# Patient Record
Sex: MALE | Race: WHITE | NOT HISPANIC OR LATINO | Employment: OTHER | URBAN - METROPOLITAN AREA
[De-identification: names, ages, dates, MRNs, and addresses within clinical notes are randomized per-mention and may not be internally consistent; named-entity substitution may affect disease eponyms.]

---

## 2017-09-29 DIAGNOSIS — I73.9 PAD (PERIPHERAL ARTERY DISEASE): Primary | ICD-10-CM

## 2017-10-02 ENCOUNTER — CLINICAL SUPPORT (OUTPATIENT)
Dept: CARDIOLOGY | Facility: CLINIC | Age: 77
End: 2017-10-02
Payer: MEDICARE

## 2017-10-02 DIAGNOSIS — I73.9 PAD (PERIPHERAL ARTERY DISEASE): ICD-10-CM

## 2017-10-02 LAB — VASCULAR ANKLE BRACHIAL INDEX (ABI) RIGHT: 0.96 (ref 0.9–1.2)

## 2017-10-02 PROCEDURE — 93924 LWR XTR VASC STDY BILAT: CPT | Mod: S$GLB,,, | Performed by: INTERNAL MEDICINE

## 2017-10-04 ENCOUNTER — OFFICE VISIT (OUTPATIENT)
Dept: CARDIOLOGY | Facility: CLINIC | Age: 77
End: 2017-10-04
Payer: MEDICARE

## 2017-10-04 ENCOUNTER — DOCUMENTATION ONLY (OUTPATIENT)
Dept: CARDIOLOGY | Facility: CLINIC | Age: 77
End: 2017-10-04

## 2017-10-04 VITALS
DIASTOLIC BLOOD PRESSURE: 102 MMHG | HEART RATE: 72 BPM | WEIGHT: 164.88 LBS | OXYGEN SATURATION: 98 % | HEIGHT: 69 IN | BODY MASS INDEX: 24.42 KG/M2 | SYSTOLIC BLOOD PRESSURE: 199 MMHG

## 2017-10-04 DIAGNOSIS — I73.9 PAD (PERIPHERAL ARTERY DISEASE): Primary | ICD-10-CM

## 2017-10-04 DIAGNOSIS — R07.9 CHEST PAIN, UNSPECIFIED TYPE: ICD-10-CM

## 2017-10-04 DIAGNOSIS — I77.9 ARTERIAL DISEASE: ICD-10-CM

## 2017-10-04 PROCEDURE — 99204 OFFICE O/P NEW MOD 45 MIN: CPT | Mod: S$GLB,,, | Performed by: INTERNAL MEDICINE

## 2017-10-04 PROCEDURE — 99999 PR PBB SHADOW E&M-EST. PATIENT-LVL IV: CPT | Mod: PBBFAC,,, | Performed by: INTERNAL MEDICINE

## 2017-10-04 RX ORDER — FLUTICASONE PROPIONATE 50 MCG
1 SPRAY, SUSPENSION (ML) NASAL DAILY PRN
COMMUNITY
Start: 2017-09-07

## 2017-10-04 RX ORDER — SODIUM CHLORIDE 9 MG/ML
3 INJECTION, SOLUTION INTRAVENOUS CONTINUOUS
Status: CANCELLED | OUTPATIENT
Start: 2017-10-04 | End: 2017-10-04

## 2017-10-04 RX ORDER — ASPIRIN 81 MG/1
81 TABLET ORAL 2 TIMES DAILY
COMMUNITY

## 2017-10-04 RX ORDER — ATORVASTATIN CALCIUM 10 MG/1
10 TABLET, FILM COATED ORAL DAILY
Qty: 90 TABLET | Refills: 3 | Status: SHIPPED | OUTPATIENT
Start: 2017-10-04 | End: 2017-11-08 | Stop reason: SDUPTHER

## 2017-10-04 RX ORDER — AMLODIPINE BESYLATE 5 MG/1
5 TABLET ORAL DAILY
Qty: 30 TABLET | Refills: 11 | Status: SHIPPED | OUTPATIENT
Start: 2017-10-04 | End: 2017-11-08 | Stop reason: SDUPTHER

## 2017-10-04 RX ORDER — DIPHENHYDRAMINE HCL 25 MG
50 CAPSULE ORAL ONCE
Status: CANCELLED | OUTPATIENT
Start: 2017-10-04 | End: 2017-10-04

## 2017-10-04 NOTE — PROGRESS NOTES
OUTPATIENT CATHETERIZATION INSTRUCTIONS    You have been scheduled for a procedure in the catheterization lab on Monday, October 23, 2017.     Please report to the Cardiology Waiting Area on the Third floor of the hospital and check in at 7 AM.   You will then be taken to the SSCU (Short Stay Cardiac Unit) and prepared for your procedure. Please be aware that this is not the time of your procedure but the time you are to arrive. The procedures are scheduled on an hourly basis; however, emergency cases take precedence over all other cases.       You may not have anything to eat or drink after midnight the night before your test. You may take your regular morning medications with water. If there are any medications that you should not take you will be instructed to hold them that morning. If you are diabetic and on Metformin (Glucophage) do not take it the day before, the day of, and for 2 days after your procedure.      The procedure will take 1-2 hours to perform. After the procedure, you will return to SSCU on the third floor of the hospital. You will need to lie still (or keep your arm still) for the next 4 to 6 hours to minimize bleeding from the puncture site. Your family may remain in the room with you during this time.       You may be able to be discharged home that same afternoon if there is someone to drive you home and there were no complications. If you have one of the balloon, stent, or device procedures you may spend the night in the hospital. Your doctor will determine, based on your progress, the date and time of your discharge. The results of your procedure will be discussed with you before you are discharged. Any further testing or procedures will be scheduled for you either before you leave or you will be called with these appointments.       If you should have any questions, concerns, or need to change the date of your procedure, please call GILBERT Mcdonnell @ [phone]    Special  Instructions:    None        THE ABOVE INSTRUCTIONS WERE GIVEN TO THE PATIENT VERBALLY AND THEY VERBALIZED UNDERSTANDING.  THEY DO NOT REQUIRE ANY SPECIAL NEEDS AND DO NOT HAVE ANY LEARNING BARRIERS.          Directions for Reporting to Cardiology Waiting Area in the Hospital  If you park in the Parking Garage:  Take elevators to the1st floor of the parking garage.  Continue past the gift shop, coffee shop, and piano.  Take a right and go to the gold elevators. (Elevator B)  Take the elevator to the 3rd floor.  Follow the arrow on the sign on the wall that says Cath Lab Registration/EP Lab Registration.  Follow the long hallway all the way around until you come to a big open area.  This is the registration area.  Check in at Reception Desk.    OR    If family is dropping you off:  Have them drop you off at the front of the Hospital under the green overhang.  Enter through the doors and take a right.  Take the E elevators to the 3rd floor Cardiology Waiting Area.  Check in at the Reception Desk in the waiting room.

## 2017-10-04 NOTE — PROGRESS NOTES
"    Interventional Cardiology Clinic Note  Reason for Visit: Fall    HPI:   78 y/o male who reports a h/o 5 stents (physical record shows 90% left iliac stenosis with multiple stents) in 2001 and balloon angioplasty in 1981 in the [facility]. He says he gets pain in the posterior thighs and buttocks over the last year. As for calves, "not that much pain". The patient reports he can walk 2 blocks before he gets pain in the mid-back, posterior thighs and buttocks. He denies CP and rests for one minute before resuming walking. The patient will get LOZA "at times, depending on how fast I'm moving". He takes two "cheap" aspirins daily. The patient reports bilateral toe tingling at rest for the last year. He denies poikilothermia. The patient denies claudication at rest. The patient does not awaken with sleep regularly with LE pain. He denies wounds and ulcers on his toes. He currently smokes.     Review of Systems   Constitution: Negative for chills and fever.   HENT: Negative for ear discharge.    Eyes: Negative for pain and visual disturbance.   Cardiovascular: Positive for claudication. Negative for chest pain, dyspnea on exertion, irregular heartbeat, leg swelling, orthopnea, palpitations, paroxysmal nocturnal dyspnea and syncope.   Respiratory: Negative for hemoptysis, shortness of breath and wheezing.    Skin: Negative for rash and suspicious lesions.   Musculoskeletal: Negative for joint pain and muscle weakness.   Gastrointestinal: Negative for abdominal pain, diarrhea, hematemesis, hematochezia, melena, nausea and vomiting.   Genitourinary: Negative for dysuria and frequency.   Neurological: Negative for focal weakness, headaches and tremors.   Psychiatric/Behavioral: Negative for altered mental status, suicidal ideas and thoughts of violence.       PMH:   PAD (left iliac stenting)  Torn rotator cuff surgery  Stenting (left) and balloon angioplasty (right)  Allergies:     Review of patient's allergies indicates: " "  Allergen Reactions    Pcn [penicillins] Swelling     Medications:     ASA    Social History:     Social History   Substance Use Topics    Smoking status: Current Every Day Smoker    Smokeless tobacco: Never Used    Alcohol use No     Family History:   History reviewed. No pertinent family history.    Physical Exam   Constitutional: He is oriented to person, place, and time. He appears well-developed.   HENT:   Head: Normocephalic and atraumatic.   Eyes: EOM are normal.   Neck: Normal range of motion.   Cardiovascular: Normal rate, regular rhythm and normal heart sounds.  Exam reveals no gallop and no friction rub.    No murmur heard.  Pulses:       Radial pulses are 2+ on the right side, and 2+ on the left side.   2+ radial, femoral, popliteal, DP, and TP pulses bilaterally. No ulcers or wounds on the LEs. +Varicose veins   Pulmonary/Chest: Effort normal. He has no wheezes. He has no rales.   Abdominal: Soft. Bowel sounds are normal. He exhibits no distension. There is no tenderness. There is no rebound.   Musculoskeletal: Normal range of motion. He exhibits no edema.   Neurological: He is alert and oriented to person, place, and time. He has normal strength. No cranial nerve deficit or sensory deficit.   Skin: Skin is warm.   Psychiatric: He has a normal mood and affect.     BP (!) 199/102 (BP Location: Right arm, Patient Position: Sitting, BP Method: Large (Automatic))   Pulse 72   Ht 5' 9" (1.753 m)   Wt 74.8 kg (164 lb 14.5 oz)   SpO2 98%   BMI 24.35 kg/m²          Labs:     No results found for: NA, K, CL, CO2, BUN, CREATININE, GLUCOSE, ANIONGAP  No results found for: HGBA1C  No results found for: BNP, BNPTRIAGEBLO No results found for: WBC, HGB, HCT, PLT, GRAN  No results found for: CHOL, HDL, LDLCALC, TRIG       No results found for: EF    EKG: None on file    Assessment and Plan  78 y/o male who reports a h/o 5 stents (physical record shows 90% left iliac stenosis with multiple stents) in 2001 and " balloon angioplasty in 1981 in the [facility]. The patient presents with exertional mid-back pain, thigh, and buttock pain.     Claudication, Carlos IIb  - 30% decrease of RMAESH post-exercise  - aortogram with bilateral runoff  - PTA candidate (BMS) with Plavix for one month if stent used (cataract surgery soon)  - start low dose atorvastatin (check LFTs)  - consents signed  - left CFA access  - c/w ASA    HTN  - PET stress test with RFs that include claudication and severe HTN, and smoking  - start amlodipine     Tobacco abuse  - encourage smoking cessation    Signed:    Luis Manuel Quijano

## 2017-10-20 ENCOUNTER — CLINICAL SUPPORT (OUTPATIENT)
Dept: CARDIOLOGY | Facility: CLINIC | Age: 77
End: 2017-10-20
Payer: MEDICARE

## 2017-10-20 DIAGNOSIS — R07.9 CHEST PAIN, UNSPECIFIED TYPE: ICD-10-CM

## 2017-10-20 LAB — DIASTOLIC DYSFUNCTION: NO

## 2017-10-20 PROCEDURE — 78492 MYOCRD IMG PET MLT RST&STRS: CPT | Mod: S$GLB,,, | Performed by: INTERNAL MEDICINE

## 2017-10-20 PROCEDURE — A9555 RB82 RUBIDIUM: HCPCS | Mod: S$GLB,,, | Performed by: INTERNAL MEDICINE

## 2017-10-20 PROCEDURE — 93015 CV STRESS TEST SUPVJ I&R: CPT | Mod: S$GLB,,, | Performed by: INTERNAL MEDICINE

## 2017-10-23 ENCOUNTER — SURGERY (OUTPATIENT)
Age: 77
End: 2017-10-23

## 2017-10-23 ENCOUNTER — HOSPITAL ENCOUNTER (OUTPATIENT)
Facility: HOSPITAL | Age: 77
Discharge: HOME | End: 2017-10-23
Attending: INTERNAL MEDICINE | Admitting: INTERNAL MEDICINE
Payer: MEDICARE

## 2017-10-23 VITALS
HEIGHT: 69 IN | DIASTOLIC BLOOD PRESSURE: 92 MMHG | WEIGHT: 164.88 LBS | HEART RATE: 69 BPM | SYSTOLIC BLOOD PRESSURE: 187 MMHG | OXYGEN SATURATION: 95 % | RESPIRATION RATE: 20 BRPM | BODY MASS INDEX: 24.42 KG/M2 | TEMPERATURE: 98 F

## 2017-10-23 DIAGNOSIS — I73.9 PAD (PERIPHERAL ARTERY DISEASE): ICD-10-CM

## 2017-10-23 DIAGNOSIS — R94.39 ABNORMAL RESULT OF OTHER CARDIOVASCULAR FUNCTION STUDY (CODE): ICD-10-CM

## 2017-10-23 PROBLEM — I10 HYPERTENSION: Status: ACTIVE | Noted: 2017-10-23

## 2017-10-23 LAB
ABO + RH BLD: NORMAL
BLD GP AB SCN CELLS X3 SERPL QL: NORMAL

## 2017-10-23 PROCEDURE — 86901 BLOOD TYPING SEROLOGIC RH(D): CPT

## 2017-10-23 PROCEDURE — 86900 BLOOD TYPING SEROLOGIC ABO: CPT

## 2017-10-23 PROCEDURE — 93458 L HRT ARTERY/VENTRICLE ANGIO: CPT | Mod: 26,,, | Performed by: INTERNAL MEDICINE

## 2017-10-23 PROCEDURE — 99152 MOD SED SAME PHYS/QHP 5/>YRS: CPT

## 2017-10-23 PROCEDURE — 75716 ARTERY X-RAYS ARMS/LEGS: CPT | Mod: 26,59,, | Performed by: INTERNAL MEDICINE

## 2017-10-23 PROCEDURE — 36246 INS CATH ABD/L-EXT ART 2ND: CPT | Mod: 59,,, | Performed by: INTERNAL MEDICINE

## 2017-10-23 PROCEDURE — 25000003 PHARM REV CODE 250: Performed by: INTERNAL MEDICINE

## 2017-10-23 PROCEDURE — 75625 CONTRAST EXAM ABDOMINL AORTA: CPT | Mod: 26,59,, | Performed by: INTERNAL MEDICINE

## 2017-10-23 PROCEDURE — 99152 MOD SED SAME PHYS/QHP 5/>YRS: CPT | Mod: ,,, | Performed by: INTERNAL MEDICINE

## 2017-10-23 RX ORDER — NAPROXEN SODIUM 220 MG/1
81 TABLET, FILM COATED ORAL ONCE
Status: COMPLETED | OUTPATIENT
Start: 2017-10-23 | End: 2017-10-23

## 2017-10-23 RX ORDER — CLOPIDOGREL 300 MG/1
600 TABLET, FILM COATED ORAL ONCE
Status: COMPLETED | OUTPATIENT
Start: 2017-10-23 | End: 2017-10-23

## 2017-10-23 RX ORDER — SODIUM CHLORIDE 9 MG/ML
3 INJECTION, SOLUTION INTRAVENOUS CONTINUOUS
Status: ACTIVE | OUTPATIENT
Start: 2017-10-23 | End: 2017-10-23

## 2017-10-23 RX ORDER — SODIUM CHLORIDE 9 MG/ML
1.5 INJECTION, SOLUTION INTRAVENOUS CONTINUOUS
Status: ACTIVE | OUTPATIENT
Start: 2017-10-23 | End: 2017-10-23

## 2017-10-23 RX ORDER — CARVEDILOL 6.25 MG/1
6.25 TABLET ORAL 2 TIMES DAILY WITH MEALS
Qty: 180 TABLET | Refills: 3 | Status: SHIPPED | OUTPATIENT
Start: 2017-10-23 | End: 2018-10-19 | Stop reason: SDUPTHER

## 2017-10-23 RX ORDER — CARVEDILOL 6.25 MG/1
6.25 TABLET ORAL 2 TIMES DAILY
Status: DISCONTINUED | OUTPATIENT
Start: 2017-10-23 | End: 2017-10-23 | Stop reason: HOSPADM

## 2017-10-23 RX ORDER — DIPHENHYDRAMINE HCL 50 MG
50 CAPSULE ORAL ONCE
Status: COMPLETED | OUTPATIENT
Start: 2017-10-23 | End: 2017-10-23

## 2017-10-23 RX ORDER — AMLODIPINE BESYLATE 5 MG/1
5 TABLET ORAL ONCE
Status: COMPLETED | OUTPATIENT
Start: 2017-10-23 | End: 2017-10-23

## 2017-10-23 RX ADMIN — AMLODIPINE BESYLATE 5 MG: 5 TABLET ORAL at 09:10

## 2017-10-23 RX ADMIN — DIPHENHYDRAMINE HYDROCHLORIDE 50 MG: 50 CAPSULE ORAL at 07:10

## 2017-10-23 RX ADMIN — CLOPIDOGREL BISULFATE 600 MG: 300 TABLET, FILM COATED ORAL at 09:10

## 2017-10-23 RX ADMIN — SODIUM CHLORIDE 3 ML/KG/HR: 0.9 INJECTION, SOLUTION INTRAVENOUS at 07:10

## 2017-10-23 RX ADMIN — CARVEDILOL 6.25 MG: 6.25 TABLET, FILM COATED ORAL at 03:10

## 2017-10-23 RX ADMIN — ASPIRIN 81 MG CHEWABLE TABLET 81 MG: 81 TABLET CHEWABLE at 09:10

## 2017-10-23 NOTE — PROCEDURES
"    Post Cath Note  Referring Physician: John Small MD  Procedure: ANGIOGRAM-EXTREMITY-LOWER (N/A), PTA-PERIPHERAL (N/A)       Access: Right CFA    Three vessel coronary artery disease. LMCA disease. PAD.    See full report for further details    Intervention:   None  Closure device: Manual pressure    Post Cath Exam:   BP (!) 183/60 (BP Location: Left arm, Patient Position: Lying)   Pulse 78   Temp 96.4 °F (35.8 °C) (Oral)   Resp 20   Ht 5' 9" (1.753 m)   Wt 74.8 kg (164 lb 14.5 oz)   SpO2 97%   BMI 24.35 kg/m²   No unusual pain, thrill or bruit at vascular access site.  Distal pulse present without signs of ischemia. Small hematoma noted; hemostasis achieved with additional manual pressure.     Recommendations:   - Routine post-cath care  - IVF at 125 cc/hr for 4 hrs  - Continue medical management, Risk factor reduction, CTS consult, Follow-up with outpatient cardiologist  - start NOAC for PAD  "

## 2017-10-23 NOTE — HPI
78 y/o male who reports a h/o 5 stents (90% left iliac stenosis with multiple stents) in 2001 and balloon angioplasty in 1981 in the [facility]. The patient came into clinic on 10/4. He says he gets pain in the posterior thighs and buttocks over the last year such that he can walk 2 blocks before he gets pain in the mid-back, posterior thighs and buttocks. He denies calf pain, CP and rests for one minute before resuming walking. The patient will get LOZA and reports bilateral toe tingling at rest for the last year. He denies poikilothermia and claudication at rest. The patient does not awaken with sleep regularly with LE pain. He denies wounds and ulcers on his toes. He currently smokes. The patient currently denies CP and SOB.

## 2017-10-23 NOTE — SUBJECTIVE & OBJECTIVE
No past medical history on file.    No past surgical history on file.    Review of patient's allergies indicates:   Allergen Reactions    Pcn [penicillins] Swelling       PTA Medications   Medication Sig    amlodipine (NORVASC) 5 MG tablet Take 1 tablet (5 mg total) by mouth once daily.    ascorbic acid, vitamin C, (VITAMIN C) 100 MG tablet Take 100 mg by mouth once daily.    aspirin (ECOTRIN) 81 MG EC tablet Take 81 mg by mouth 2 (two) times daily.    atorvastatin (LIPITOR) 10 MG tablet Take 1 tablet (10 mg total) by mouth once daily.    fluticasone (FLONASE) 50 mcg/actuation nasal spray      Family History     None        Social History Main Topics    Smoking status: Current Every Day Smoker    Smokeless tobacco: Never Used    Alcohol use No    Drug use: Unknown    Sexual activity: Not on file     Review of Systems   Constitution: Negative for chills and fever.   HENT: Negative for ear discharge.    Eyes: Negative for pain and visual disturbance.   Cardiovascular: Positive for claudication. Negative for chest pain, dyspnea on exertion, irregular heartbeat, leg swelling, orthopnea, palpitations, paroxysmal nocturnal dyspnea and syncope.   Respiratory: Negative for hemoptysis, shortness of breath and wheezing.    Skin: Negative for rash and suspicious lesions.   Musculoskeletal: Negative for joint pain and muscle weakness.   Gastrointestinal: Negative for abdominal pain, diarrhea, hematemesis, hematochezia, melena, nausea and vomiting.   Genitourinary: Negative for dysuria and frequency.   Neurological: Positive for paresthesias. Negative for focal weakness, headaches and tremors.   Psychiatric/Behavioral: Negative for altered mental status, suicidal ideas and thoughts of violence.     Objective:     Vital Signs (Most Recent):    Vital Signs (24h Range):           There is no height or weight on file to calculate BMI.            No intake or output data in the 24 hours ending 10/23/17  0713    Lines/Drains/Airways          No matching active lines, drains, or airways          Physical Exam   Constitutional: He is oriented to person, place, and time. He appears well-developed.   HENT:   Head: Normocephalic and atraumatic.   Eyes: EOM are normal.   Neck: Normal range of motion.   Cardiovascular: Normal rate, regular rhythm and normal heart sounds.  Exam reveals no gallop and no friction rub.    No murmur heard.  Pulses:       Radial pulses are 2+ on the right side, and 2+ on the left side.        Femoral pulses are 2+ on the right side, and 2+ on the left side.       Popliteal pulses are 2+ on the right side, and 2+ on the left side.        Dorsalis pedis pulses are 2+ on the right side, and 2+ on the left side.        Posterior tibial pulses are 2+ on the right side, and 2+ on the left side.   Pulmonary/Chest: Effort normal. He has no wheezes. He has no rales.   Abdominal: Soft. Bowel sounds are normal. He exhibits no distension. There is no tenderness. There is no rebound.   Musculoskeletal: Normal range of motion. He exhibits no edema.   Neurological: He is alert and oriented to person, place, and time. He has normal strength. No cranial nerve deficit or sensory deficit.   Skin: Skin is warm.   Psychiatric: He has a normal mood and affect.       Significant Labs: CMP No results for input(s): NA, K, CL, CO2, GLU, BUN, CREATININE, CALCIUM, PROT, ALBUMIN, BILITOT, ALKPHOS, AST, ALT, ANIONGAP, ESTGFRAFRICA, EGFRNONAA in the last 48 hours., CBC No results for input(s): WBC, HGB, HCT, PLT in the last 48 hours., INR No results for input(s): INR, PROTIME in the last 48 hours., Lipid Panel No results for input(s): CHOL, HDL, LDLCALC, TRIG, CHOLHDL in the last 48 hours. and Troponin No results for input(s): TROPONINI in the last 48 hours.    Significant Imaging:   CONCLUSIONS: ABNORMAL MYOCARDIAL PERFUSION PET STRESS TEST - PET stress 10/20  1. There is a moderate sized mild intensity fixed defect  consistent with non-transmural infarct in the base to apical inferior wall in the usual distribution of the Right Coronary Artery. This defect comprises 15 % of the left ventricular myocardium.   2. There is a small to moderate sized mild ischemia in the anteroapical wall in the usual distribution of the mid Left Anterior Descending Artery and D2. This defect comprises 12 % of the left ventricular myocardium.   3. There is abnormal wall motion at rest showing mild global hypokinesis of the left ventricle. hypokinesis of the inferior wall of the left ventricle. There is abnormal wall motion at stress showing moderate global hypokinesis of the left ventricle.   hypokinesis of the apical and inferior walls of the left ventricle.   4. Resting LV function is 40 % and significantly decreased to 28 % at stress.  (normal is >= 51%)  5. The ventricular volumes are normal at rest and stress.   6. The extracardiac distribution of radioactivity is normal.     RAMESH - 10/2  Right:  The exercise component of this study demonstrates peripheral arterial disease in the right extremity that may account for symptoms if present.     Left:  The exercise component of this study demonstrates peripheral arterial disease in the left extremity that may account for symptoms if present.

## 2017-10-23 NOTE — PROGRESS NOTES
Pt is AAOx3 and in no apparent distress.  Groin site c/d/i and soft.  Provided a copy of discharge instructions.  Teaching performed.  Pt verbalized understanding and denied any questions.  Provided pt with prescriptions. PIV d/c catheter tip intact.  2x2 applied and no active bleeding noted.  Pt refused wheelchair and is walking out with wife for transport home.

## 2017-10-23 NOTE — ASSESSMENT & PLAN NOTE
- no e/o ALI  - RAMESH with similar disease bilaterally  - right CFA access  - aortogram with bilateral runoff  - c/w ASA and statin

## 2017-10-23 NOTE — PROGRESS NOTES
Pt ambulated around unit and groin site remained c/d/i and soft.  VSS.  Wife at bedside.  Will continue to monitor.

## 2017-10-23 NOTE — H&P
[facility]  Interventional Cardiology  H&P    Patient Name: Gal Waller  MRN: [id number]  Admission Date: 10/23/2017  Code Status: No Order   Attending Provider: John Small MD   Primary Care Physician: Talita Arita MD  Principal Problem:<principal problem not specified>    Patient information was obtained from patient and past medical records.     Subjective:     Chief Complaint:  LE pain     HPI:  76 y/o male who reports a h/o 5 stents (90% left iliac stenosis with multiple stents) in 2001 and balloon angioplasty in 1981 in the [facility]. The patient came into clinic on 10/4. He says he gets pain in the posterior thighs and buttocks over the last year such that he can walk 2 blocks before he gets pain in the mid-back, posterior thighs and buttocks. He denies calf pain, CP and rests for one minute before resuming walking. The patient will get LOZA and reports bilateral toe tingling at rest for the last year. He denies poikilothermia and claudication at rest. The patient does not awaken with sleep regularly with LE pain. He denies wounds and ulcers on his toes. He currently smokes. The patient currently denies CP and SOB.    No past medical history on file.    No past surgical history on file.    Review of patient's allergies indicates:   Allergen Reactions    Pcn [penicillins] Swelling       PTA Medications   Medication Sig    amlodipine (NORVASC) 5 MG tablet Take 1 tablet (5 mg total) by mouth once daily.    ascorbic acid, vitamin C, (VITAMIN C) 100 MG tablet Take 100 mg by mouth once daily.    aspirin (ECOTRIN) 81 MG EC tablet Take 81 mg by mouth 2 (two) times daily.    atorvastatin (LIPITOR) 10 MG tablet Take 1 tablet (10 mg total) by mouth once daily.    fluticasone (FLONASE) 50 mcg/actuation nasal spray      Family History     None        Social History Main Topics    Smoking status: Current Every Day Smoker    Smokeless tobacco: Never Used    Alcohol use No    Drug  use: Unknown    Sexual activity: Not on file     Review of Systems   Constitution: Negative for chills and fever.   HENT: Negative for ear discharge.    Eyes: Negative for pain and visual disturbance.   Cardiovascular: Positive for claudication. Negative for chest pain, dyspnea on exertion, irregular heartbeat, leg swelling, orthopnea, palpitations, paroxysmal nocturnal dyspnea and syncope.   Respiratory: Negative for hemoptysis, shortness of breath and wheezing.    Skin: Negative for rash and suspicious lesions.   Musculoskeletal: Negative for joint pain and muscle weakness.   Gastrointestinal: Negative for abdominal pain, diarrhea, hematemesis, hematochezia, melena, nausea and vomiting.   Genitourinary: Negative for dysuria and frequency.   Neurological: Positive for paresthesias. Negative for focal weakness, headaches and tremors.   Psychiatric/Behavioral: Negative for altered mental status, suicidal ideas and thoughts of violence.     Objective:     Vital Signs (Most Recent):    Vital Signs (24h Range):           There is no height or weight on file to calculate BMI.            No intake or output data in the 24 hours ending 10/23/17 0713    Lines/Drains/Airways          No matching active lines, drains, or airways          Physical Exam   Constitutional: He is oriented to person, place, and time. He appears well-developed.   HENT:   Head: Normocephalic and atraumatic.   Eyes: EOM are normal.   Neck: Normal range of motion.   Cardiovascular: Normal rate, regular rhythm and normal heart sounds.  Exam reveals no gallop and no friction rub.    No murmur heard.  Pulses:       Radial pulses are 2+ on the right side, and 2+ on the left side.        Femoral pulses are 2+ on the right side, and 2+ on the left side.       Popliteal pulses are 2+ on the right side, and 2+ on the left side.        Dorsalis pedis pulses are 2+ on the right side, and 2+ on the left side.        Posterior tibial pulses are 2+ on the right  side, and 2+ on the left side.   Pulmonary/Chest: Effort normal. He has no wheezes. He has no rales.   Abdominal: Soft. Bowel sounds are normal. He exhibits no distension. There is no tenderness. There is no rebound.   Musculoskeletal: Normal range of motion. He exhibits no edema.   Neurological: He is alert and oriented to person, place, and time. He has normal strength. No cranial nerve deficit or sensory deficit.   Skin: Skin is warm.   Psychiatric: He has a normal mood and affect.       Significant Labs: CMP No results for input(s): NA, K, CL, CO2, GLU, BUN, CREATININE, CALCIUM, PROT, ALBUMIN, BILITOT, ALKPHOS, AST, ALT, ANIONGAP, ESTGFRAFRICA, EGFRNONAA in the last 48 hours., CBC No results for input(s): WBC, HGB, HCT, PLT in the last 48 hours., INR No results for input(s): INR, PROTIME in the last 48 hours., Lipid Panel No results for input(s): CHOL, HDL, LDLCALC, TRIG, CHOLHDL in the last 48 hours. and Troponin No results for input(s): TROPONINI in the last 48 hours.    Significant Imaging:   CONCLUSIONS: ABNORMAL MYOCARDIAL PERFUSION PET STRESS TEST - PET stress 10/20  1. There is a moderate sized mild intensity fixed defect consistent with non-transmural infarct in the base to apical inferior wall in the usual distribution of the Right Coronary Artery. This defect comprises 15 % of the left ventricular myocardium.   2. There is a small to moderate sized mild ischemia in the anteroapical wall in the usual distribution of the mid Left Anterior Descending Artery and D2. This defect comprises 12 % of the left ventricular myocardium.   3. There is abnormal wall motion at rest showing mild global hypokinesis of the left ventricle. hypokinesis of the inferior wall of the left ventricle. There is abnormal wall motion at stress showing moderate global hypokinesis of the left ventricle.   hypokinesis of the apical and inferior walls of the left ventricle.   4. Resting LV function is 40 % and significantly decreased  to 28 % at stress.  (normal is >= 51%)  5. The ventricular volumes are normal at rest and stress.   6. The extracardiac distribution of radioactivity is normal.     RAMESH - 10/2  Right:  The exercise component of this study demonstrates peripheral arterial disease in the right extremity that may account for symptoms if present.     Left:  The exercise component of this study demonstrates peripheral arterial disease in the left extremity that may account for symptoms if present.     Assessment and Plan:     Hypertension    - c/w amlodipine        Abnormal cardiovascular stress test    - asx but PET c/w small-moderate mild ischemia in the anteroapical wall (12 % of the LV myocardium)  - consent obtained for LHC via right CFA access  - c/w ASA and statin         PAD (peripheral artery disease)    - no e/o ALI  - RAMESH with similar disease bilaterally  - right CFA access  - aortogram with bilateral runoff  - c/w ASA and statin              VTE Risk Mitigation     None          Luis Manuel Quijano MD  Interventional Cardiology   [facility]

## 2017-10-23 NOTE — PLAN OF CARE
Problem: Patient Care Overview  Goal: Plan of Care Review  Outcome: Ongoing (interventions implemented as appropriate)  Received report from GILBERT Vasquez. Patient s/p LHC and PTA, AAOx3. VSS, no c/o pain or discomfort at this time, resp even and unlabored. Gauze/tegaderm dressing to R groin is CDI. No active bleeding. Bruising noted to groin, No hematoma noted. Post procedure protocol reviewed with patient. Understanding verbalized. Family members updated via phone.. Nurse call bell within reach. Will continue to monitor per post procedure protocol.

## 2017-10-23 NOTE — ASSESSMENT & PLAN NOTE
- asx but PET c/w small-moderate mild ischemia in the anteroapical wall (12 % of the LV myocardium)  - consent obtained for LHC via right CFA access  - c/w ASA and statin

## 2017-10-24 NOTE — DISCHARGE SUMMARY
[facility]  Interventional Cardiology  Discharge Summary      Patient Name: Gal Waller  MRN: [id number]  Admission Date: 10/23/2017  Hospital Length of Stay: 0 days  Discharge Date and Time:  10/23/2017 8:55 PM  Attending Physician: No att. providers found  Discharging Provider: Luis Manuel Quijano MD  Primary Care Physician: Talita Arita MD    HPI: 76 y/o male who reports a h/o 5 stents (90% left iliac stenosis with multiple stents) in 2001 and balloon angioplasty in 1981 in the RLE. The patient came into clinic on 10/4. He says he gets pain in the posterior thighs and buttocks over the last year such that he can walk 2 blocks before he gets pain in the mid-back, posterior thighs and buttocks. He denies calf pain, CP and rests for one minute before resuming walking. The patient will get LOZA and reports bilateral toe tingling at rest for the last year. He denies poikilothermia and claudication at rest. The patient does not awaken with sleep regularly with LE pain. He denies wounds and ulcers on his toes. He currently smokes. The patient currently denies CP and SOB.    Procedure(s) (LRB):  ANGIOGRAM-EXTREMITY-LOWER (N/A)  PTA-PERIPHERAL (N/A)     Indwelling Lines/Drains at time of discharge:  Lines/Drains/Airways          No matching active lines, drains, or airways          Hospital Course The patient underwent LHC and aortogram with LE runoff. There were no immediate complications. LHC c/w three vessel coronary artery disease, LMCA disease. He has PAD, as per report. CTS surgery evaluation as an outpatient. NOAC for PAD. Coreg addition for CAD and HTN.       Significant Diagnostic Studies: Angiograms    Pending Diagnostic Studies:     None        Final Active Diagnoses:    Diagnosis Date Noted POA    Abnormal cardiovascular stress test [R94.39] 10/23/2017 Yes    Hypertension [I10] 10/23/2017 Yes    PAD (peripheral artery disease) [I73.9] 10/04/2017 Yes      Problems Resolved During  this Admission:    Diagnosis Date Noted Date Resolved POA       Discharged Condition: good    Follow Up:  Follow-up Information     Talita Arita MD.    Specialty:  Internal Medicine  Contact information:  [address] [facility] [address]  [facility] [address]  [phone]             Cardiothoracic Surgery In 2 weeks.    Contact information:  [address]  [address]  [facility] [address]  [phone]             John Small MD.    Specialties:  Cardiology, INTERVENTIONAL CARDIOLOGY  Contact information:  [address]  [facility] [address]  [phone]                 Patient Instructions:     Ambulatory consult to Cardiothoracic Surgery   Referral Priority: Routine Referral Type: Surgical   Referral Reason: Specialty Services Required    Requested Specialty: Cardiothoracic Surgery    Number of Visits Requested: 1      Diet general     Activity as tolerated     Call MD for:  temperature >100.4     Call MD for:  persistent nausea and vomiting or diarrhea     Call MD for:  severe uncontrolled pain     Call MD for:  redness, tenderness, or signs of infection (pain, swelling, redness, odor or green/yellow discharge around incision site)     Call MD for:  difficulty breathing or increased cough     Call MD for:  severe persistent headache     Call MD for:  worsening rash     Call MD for:  persistent dizziness, light-headedness, or visual disturbances     Call MD for:  increased confusion or weakness       Medications:  Reconciled Home Medications:   Discharge Medication List as of 10/23/2017  5:12 PM      START taking these medications    Details   apixaban 5 mg Tab Take 1 tablet (5 mg total) by mouth 2 (two) times daily., Starting Mon 10/23/2017, Normal      carvedilol (COREG) 6.25 MG tablet Take 1 tablet (6.25 mg total) by mouth 2 (two) times daily with meals., Starting Mon 10/23/2017, Until Tue 10/23/2018, Normal         CONTINUE these medications which have NOT CHANGED    Details   amlodipine (NORVASC) 5  MG tablet Take 1 tablet (5 mg total) by mouth once daily., Starting Wed 10/4/2017, Until Thu 10/4/2018, Normal      ascorbic acid, vitamin C, (VITAMIN C) 100 MG tablet Take 100 mg by mouth once daily., Historical Med      aspirin (ECOTRIN) 81 MG EC tablet Take 81 mg by mouth 2 (two) times daily., Historical Med      atorvastatin (LIPITOR) 10 MG tablet Take 1 tablet (10 mg total) by mouth once daily., Starting Wed 10/4/2017, Until Thu 10/4/2018, Normal      fluticasone (FLONASE) 50 mcg/actuation nasal spray Starting Thu 9/7/2017, Historical Med             Disposition: Home    Luis Manuel Quijano MD  Interventional Cardiology  [facility]

## 2017-10-30 ENCOUNTER — OFFICE VISIT (OUTPATIENT)
Dept: CARDIOTHORACIC SURGERY | Facility: CLINIC | Age: 77
End: 2017-10-30
Payer: MEDICARE

## 2017-10-30 VITALS
WEIGHT: 167.19 LBS | HEIGHT: 69 IN | BODY MASS INDEX: 24.76 KG/M2 | OXYGEN SATURATION: 99 % | SYSTOLIC BLOOD PRESSURE: 185 MMHG | TEMPERATURE: 98 F | HEART RATE: 62 BPM | DIASTOLIC BLOOD PRESSURE: 79 MMHG

## 2017-10-30 DIAGNOSIS — I25.10 CORONARY ARTERY DISEASE INVOLVING NATIVE CORONARY ARTERY OF NATIVE HEART WITHOUT ANGINA PECTORIS: ICD-10-CM

## 2017-10-30 PROCEDURE — 99999 PR PBB SHADOW E&M-EST. PATIENT-LVL III: CPT | Mod: PBBFAC,,, | Performed by: THORACIC SURGERY (CARDIOTHORACIC VASCULAR SURGERY)

## 2017-10-30 PROCEDURE — 99205 OFFICE O/P NEW HI 60 MIN: CPT | Mod: S$GLB,,, | Performed by: THORACIC SURGERY (CARDIOTHORACIC VASCULAR SURGERY)

## 2017-10-30 NOTE — LETTER
November 1, 2017      John Small MD  [address]  [facility] [address]           [facility] - Cardiovascular Surg  [address]  [facility] [address]  Phone: [phone]          Patient: Gal Waller   MR Number: [id number]   YOB: 1940   Date of Visit: 10/30/2017       Dear Dr. John Small:    Thank you for referring Gal Waller to me for evaluation. Attached you will find relevant portions of my assessment and plan of care.    If you have questions, please do not hesitate to call me. I look forward to following Gal Waller along with you.    Sincerely,    Everette Lira MD    Enclosure  CC:  No Recipients    If you would like to receive this communication electronically, please contact [email or web address] or [phone] to request more information on [employer] access.    For providers and/or their staff who would like to refer a patient to [facility], please contact us through our one-stop-shop provider referral line, [facility] , at [phone].    If you feel you have received this communication in error or would no longer like to receive these types of communications, please e-mail [email or web address]

## 2017-10-30 NOTE — PROGRESS NOTES
History & Physical    SUBJECTIVE:     History of Present Illness:  Patient is a 77 y.o. male presents with a h/o 5 stents (90% left iliac stenosis with multiple stents) in 2001 and balloon angioplasty in 1981 in the E. He says he gets pain in the posterior thighs and buttocks over the last year such that he can walk 2 blocks before he gets pain in the mid-back, posterior thighs and buttocks. He denies calf pain, CP and rests for one minute before resuming walking. The patient will get LOZA and reports bilateral toe tingling at rest for the last year. He denies poikilothermia and claudication at rest. The patient does not awaken with sleep regularly with LE pain.  He smoked for 50+year 1/2-3ppd, quite last week. The patient currently denies CP and SOB. It was found on his LHC that he had multivessel disease and was referred to CTS for CABG evaluation.     No chief complaint on file.      Review of patient's allergies indicates:   Allergen Reactions    Pcn [penicillins] Swelling       Current Outpatient Prescriptions   Medication Sig Dispense Refill    amlodipine (NORVASC) 5 MG tablet Take 1 tablet (5 mg total) by mouth once daily. 30 tablet 11    apixaban 5 mg Tab Take 1 tablet (5 mg total) by mouth 2 (two) times daily. 60 tablet 3    ascorbic acid, vitamin C, (VITAMIN C) 100 MG tablet Take 100 mg by mouth once daily.      aspirin (ECOTRIN) 81 MG EC tablet Take 81 mg by mouth 2 (two) times daily.      atorvastatin (LIPITOR) 10 MG tablet Take 1 tablet (10 mg total) by mouth once daily. 90 tablet 3    carvedilol (COREG) 6.25 MG tablet Take 1 tablet (6.25 mg total) by mouth 2 (two) times daily with meals. 180 tablet 3    fluticasone (FLONASE) 50 mcg/actuation nasal spray        No current facility-administered medications for this visit.        No past medical history on file.  No past surgical history on file.  No family history on file.  Social History   Substance Use Topics    Smoking status: Current Every  Day Smoker    Smokeless tobacco: Never Used    Alcohol use No        Review of Systems:  Review of Systems   Constitutional: Negative for activity change.   Respiratory: Negative for cough and shortness of breath.    Cardiovascular: Negative for chest pain, palpitations and leg swelling.   Gastrointestinal: Negative for abdominal pain, nausea and vomiting.   Endocrine: Negative for polydipsia, polyphagia and polyuria.   Genitourinary: Negative for dysuria.   Musculoskeletal: Negative for gait problem.   Skin: Negative for rash.   Allergic/Immunologic: Negative for immunocompromised state.   Neurological: Negative for dizziness, syncope and weakness.   Hematological: Does not bruise/bleed easily.   Psychiatric/Behavioral: Negative for behavioral problems.       OBJECTIVE:     Vital Signs (Most Recent)              Physical Exam:  Physical Exam   Constitutional: He is oriented to person, place, and time. He appears well-nourished.   HENT:   Head: Normocephalic.   Cardiovascular: Normal rate, regular rhythm and normal heart sounds.    Pulmonary/Chest: Effort normal and breath sounds normal.   Abdominal: Soft.   Musculoskeletal: Normal range of motion.   Neurological: He is alert and oriented to person, place, and time.   Skin: Skin is warm and dry. Capillary refill takes 2 to 3 seconds.   Psychiatric: He has a normal mood and affect.     Diagnostic Results:  [facility] Angiographic Results Diagnostic:      - Left Main Coronary Artery:             The distal LM has a 75% stenosis. There is KARSTEN 3 flow.     - Left Anterior Descending Artery:             The proximal LAD has a 90% stenosis. There is KARSTEN 3 flow.     - Left Circumflex Artery:             The mid LCX has a 70% stenosis. There is KARSTEN 3 flow.     - Right Coronary Artery:             The ostial RCA has subtotal (99). There is KARSTEN 3 flow.    ASSESSMENT/PLAN:   Patient is a 77 y.o. male presents with a h/o 5 stents (90% left iliac stenosis with multiple stents) in  2001,  balloon angioplasty in 1981 in the RLE, PAD, HTN, and CAD      PLAN:WIll discuss with Dr. Small PCI due to distal LAD lesion, maybe better to undergo PCI

## 2017-11-03 LAB
CORONARY STENOSIS: ABNORMAL
PERIPHERAL STENOSIS: ABNORMAL

## 2017-11-08 ENCOUNTER — DOCUMENTATION ONLY (OUTPATIENT)
Dept: CARDIOLOGY | Facility: CLINIC | Age: 77
End: 2017-11-08

## 2017-11-08 ENCOUNTER — OFFICE VISIT (OUTPATIENT)
Dept: CARDIOLOGY | Facility: CLINIC | Age: 77
End: 2017-11-08
Payer: MEDICARE

## 2017-11-08 VITALS
WEIGHT: 164.69 LBS | DIASTOLIC BLOOD PRESSURE: 85 MMHG | BODY MASS INDEX: 24.39 KG/M2 | HEART RATE: 75 BPM | OXYGEN SATURATION: 98 % | HEIGHT: 69 IN | SYSTOLIC BLOOD PRESSURE: 177 MMHG

## 2017-11-08 DIAGNOSIS — I25.10 CORONARY ARTERY DISEASE INVOLVING NATIVE CORONARY ARTERY OF NATIVE HEART WITHOUT ANGINA PECTORIS: Primary | ICD-10-CM

## 2017-11-08 DIAGNOSIS — R94.39 ABNORMAL CARDIOVASCULAR STRESS TEST: ICD-10-CM

## 2017-11-08 DIAGNOSIS — I10 HYPERTENSION, UNSPECIFIED TYPE: ICD-10-CM

## 2017-11-08 DIAGNOSIS — I73.9 PAD (PERIPHERAL ARTERY DISEASE): ICD-10-CM

## 2017-11-08 DIAGNOSIS — I25.10 CORONARY ARTERY DISEASE, ANGINA PRESENCE UNSPECIFIED, UNSPECIFIED VESSEL OR LESION TYPE, UNSPECIFIED WHETHER NATIVE OR TRANSPLANTED HEART: Primary | ICD-10-CM

## 2017-11-08 PROCEDURE — 99999 PR PBB SHADOW E&M-EST. PATIENT-LVL IV: CPT | Mod: PBBFAC,,, | Performed by: INTERNAL MEDICINE

## 2017-11-08 PROCEDURE — 99215 OFFICE O/P EST HI 40 MIN: CPT | Mod: S$GLB,,, | Performed by: INTERNAL MEDICINE

## 2017-11-08 RX ORDER — DIPHENHYDRAMINE HCL 25 MG
50 CAPSULE ORAL ONCE
Status: CANCELLED | OUTPATIENT
Start: 2017-11-08 | End: 2017-11-08

## 2017-11-08 RX ORDER — AMLODIPINE BESYLATE 10 MG/1
10 TABLET ORAL DAILY
Qty: 30 TABLET | Refills: 11 | Status: SHIPPED | OUTPATIENT
Start: 2017-11-08 | End: 2018-10-09 | Stop reason: SDUPTHER

## 2017-11-08 RX ORDER — ATORVASTATIN CALCIUM 10 MG/1
40 TABLET, FILM COATED ORAL DAILY
Qty: 360 TABLET | Refills: 3 | Status: SHIPPED | OUTPATIENT
Start: 2017-11-08 | End: 2017-11-08 | Stop reason: SDUPTHER

## 2017-11-08 RX ORDER — SODIUM CHLORIDE 9 MG/ML
3 INJECTION, SOLUTION INTRAVENOUS CONTINUOUS
Status: CANCELLED | OUTPATIENT
Start: 2017-11-08 | End: 2017-11-08

## 2017-11-08 RX ORDER — CLOPIDOGREL BISULFATE 75 MG/1
75 TABLET ORAL DAILY
Qty: 30 TABLET | Refills: 11 | Status: ON HOLD | OUTPATIENT
Start: 2017-11-08 | End: 2017-11-21 | Stop reason: HOSPADM

## 2017-11-08 RX ORDER — ATORVASTATIN CALCIUM 40 MG/1
40 TABLET, FILM COATED ORAL DAILY
Qty: 90 TABLET | Refills: 3 | Status: SHIPPED | OUTPATIENT
Start: 2017-11-08 | End: 2018-06-07 | Stop reason: SDUPTHER

## 2017-11-08 NOTE — PROGRESS NOTES
"Subjective:    Patient ID:  Gal Waller is a 77 y.o. male who presents for follow-up of multivessel CAD.    Referring Physician: Everette Lira MD    HPI    Mr. Waller is a 76 yo Male with PMHx of HTN, Dyslipidemia, PAD s/p remote bilateral PTA (reports RLE PTA in 1981 and LLE PTA 2001) with samira IIb claudication symptoms of bilateral LExt with recent diagnosis of multivessel CAD including distal LM/prox LAD and mLCx with ostial RCA subtotal occlusion referred for assessment for coronary PCI by Dr. Lira. Considering his distal LAD lesion, PCI is suggested for Mr. Waller.    The patient currently denies CP and SOB.  He says he gets pain in the posterior thighs and buttocks over the last year such that he can walk 2 blocks before he gets pain in the mid-back, posterior thighs and buttocks. He denies calf pain, CP and rests for one minute before resuming walking. The patient will get LOZA and reports bilateral toe tingling at rest for the last year. He denies poikilothermia and rest pain. He denies wounds and ulcers on his toes but reports right groin "knot like" structure which he noticed recently. He has quit smoking 1 week ago.      Cardiac Data:  [facility] Angiographic Results Diagnostic 10/23/17:      - Left Main Coronary Artery:             The distal LM has a 75% stenosis. There is KARSTEN 3 flow.     - Left Anterior Descending Artery:             The proximal LAD has a 90% stenosis. There is KARSTEN 3 flow.     - Left Circumflex Artery:             The mid LCX has a 70% stenosis. There is KARSTEN 3 flow.     - Right Coronary Artery:             The ostial RCA has subtotal (99). There is KARSTEN 3 flow.    PET stress 10/20/17:  CONCLUSIONS: ABNORMAL MYOCARDIAL PERFUSION PET STRESS TEST  1. There is a moderate sized mild intensity fixed defect consistent with non-transmural infarct in the base to apical inferior wall in the usual distribution of the Right Coronary Artery. This defect comprises 15 % of the left " ventricular myocardium.   2. There is a small to moderate sized mild ischemia in the anteroapical wall in the usual distribution of the mid Left Anterior Descending Artery and D2. This defect comprises 12 % of the left ventricular myocardium.   3. There is abnormal wall motion at rest showing mild global hypokinesis of the left ventricle. hypokinesis of the inferior wall of the left ventricle. There is abnormal wall motion at stress showing moderate global hypokinesis of the left ventricle.   hypokinesis of the apical and inferior walls of the left ventricle.   4. Resting LV function is 40 % and significantly decreased to 28 % at stress.  (normal is >= 51%)  5. The ventricular volumes are normal at rest and stress.   6. The extracardiac distribution of radioactivity is normal    Review of Systems   Constitution: Negative for chills, decreased appetite, fever, weakness, night sweats, weight gain and weight loss.   HENT: Negative for congestion, hoarse voice, stridor and tinnitus.    Eyes: Negative for blurred vision, pain and visual disturbance.   Cardiovascular: Positive for claudication and dyspnea on exertion. Negative for chest pain, irregular heartbeat, leg swelling, near-syncope, orthopnea, palpitations, paroxysmal nocturnal dyspnea and syncope.   Respiratory: Negative for cough, hemoptysis, shortness of breath, snoring and wheezing.    Endocrine: Negative for cold intolerance, heat intolerance and polyuria.   Hematologic/Lymphatic: Negative for bleeding problem. Does not bruise/bleed easily.   Skin: Negative for color change and rash.   Musculoskeletal: Negative for arthritis, back pain, joint pain, muscle cramps, myalgias and stiffness.   Gastrointestinal: Negative for abdominal pain, anorexia, constipation, diarrhea, dysphagia, heartburn, hemorrhoids, melena, nausea and vomiting.   Genitourinary: Negative for frequency, hematuria, hesitancy, nocturia and urgency.   Neurological: Negative for difficulty with  concentration, dizziness, focal weakness, headaches, light-headedness, numbness, seizures, tremors and vertigo.   Psychiatric/Behavioral: Negative for altered mental status and depression. The patient does not have insomnia.    Allergic/Immunologic: Negative for hives.        Objective:    Physical Exam   Constitutional: He is oriented to person, place, and time. He appears well-developed and well-nourished.   HENT:   Head: Normocephalic and atraumatic.   Eyes: Conjunctivae are normal. Pupils are equal, round, and reactive to light.   Neck: Normal range of motion. No JVD present. No tracheal deviation present. No thyromegaly present.   Cardiovascular: Regular rhythm, S1 normal, S2 normal, normal heart sounds and intact distal pulses.  Exam reveals no S3.    No murmur heard.  Pulses:       Carotid pulses are 2+ on the right side, and 2+ on the left side.       Radial pulses are 2+ on the right side, and 2+ on the left side.        Femoral pulses are 2+ on the right side, and 2+ on the left side.       Dorsalis pedis pulses are 1+ on the right side.        Posterior tibial pulses are 1+ on the right side, and 1+ on the left side.   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   Pulmonary/Chest: Effort normal and breath sounds normal. No stridor. No respiratory distress. He has no wheezes. He has no rales.   Abdominal: Soft. Bowel sounds are normal. He exhibits no distension. There is no tenderness.   Musculoskeletal: Normal range of motion. He exhibits no edema or tenderness.   Neurological: He is alert and oriented to person, place, and time.   Skin: Skin is warm and dry. He is not diaphoretic. No erythema.   Psychiatric: He has a normal mood and affect.        Assessment:       1. Coronary artery disease involving native coronary artery of native heart without angina pectoris    2. PAD (peripheral artery disease) - Bilateral supra and infra renal aortic plaque, Right EIA 60% and left AT occlusion.    3. Hypertension,  uncontrolled   4.      Smoking - quit a week ago  5.      Dyslipidemia - on low intensity statin - increased it to 40 mg po daily      Plan:     Will proceed with high risk unprotected PCI of LM/LAD and RCA. Considering small caliber LCx -  Will treat LCx medically.    Considering right groin aneurysm - will obtain Left CFA access. 6Fr long sheath as has left EIA disease/tourtoisity.  CFV access for TVP during procedure - as we are going to intervene on ostial RCA  LM->LAD rotablation followed by stenting - NAZ candidate. EBU 3.5 catheter  RCA - guide catheter - likely JR but will reassess  Will perform right EIA/CFA re-look angiogram +/- PTA    - Plan for LHC (NAZ candidate)  - L CFA 6 Fr   - DAPT - plavix prescription sent to his pharmacy  - Hold Eliquis 3 days pre procedure  - Keep NPO   - Pre-cath IVF ordered  -The risks, benefits and alternatives of the procedure were explained to the patient.   -The risks of coronary angiography include but are not limited to: bleeding, infection, heart rhythm abnormalities, allergic reactions, kidney injury, stroke and death.   -The risks of moderate sedation include hypotension, respiratory depression, arrhythmias, bronchospasm, and death.   - Informed consent was obtained and the patient is agreeable to proceed with the procedure.    For uncontrolled HTN - increased dose of norvasc to 10 mg po daily.  For dyslipidemia - increased dose of lipitor to 40 mg po daily.    Jose Dillon MD  PGY-7  Interventional Cardiology

## 2017-11-08 NOTE — PROGRESS NOTES
OUTPATIENT CATHETERIZATION INSTRUCTIONS    You have been scheduled for a procedure in the catheterization lab on Monday, November 20, 2017.     Please report to the Cardiology Waiting Area on the Third floor of the hospital and check in at 10 AM.   You will then be taken to the SSCU (Short Stay Cardiac Unit) and prepared for your procedure. Please be aware that this is not the time of your procedure but the time you are to arrive. The procedures are scheduled on an hourly basis; however, emergency cases take precedence over all other cases.       You may not have anything to eat or drink after midnight the night before your test. You may take your regular morning medications with water. If there are any medications that you should not take you will be instructed to hold them that morning. If you are diabetic and on Metformin (Glucophage) do not take it the day before, the day of, and for 2 days after your procedure.      The procedure will take 1-2 hours to perform. After the procedure, you will return to SSCU on the third floor of the hospital. You will need to lie still (or keep your arm still) for the next 4 to 6 hours to minimize bleeding from the puncture site. Your family may remain in the room with you during this time.       You may be able to be discharged home that same afternoon if there is someone to drive you home and there were no complications. If you have one of the balloon, stent, or device procedures you may spend the night in the hospital. Your doctor will determine, based on your progress, the date and time of your discharge. The results of your procedure will be discussed with you before you are discharged. Any further testing or procedures will be scheduled for you either before you leave or you will be called with these appointments.       If you should have any questions, concerns, or need to change the date of your procedure, please call GILBERT Mcdonnell @ [phone]    Special  Instructions:    Hold your apixiban (Eliquis) for 3 days before your procedure. Your last dose will be on: Thursday, November 16, 2017.               THE ABOVE INSTRUCTIONS WERE GIVEN TO THE PATIENT VERBALLY AND THEY VERBALIZED UNDERSTANDING.  THEY DO NOT REQUIRE ANY SPECIAL NEEDS AND DO NOT HAVE ANY LEARNING BARRIERS.          Directions for Reporting to Cardiology Waiting Area in the Hospital  If you park in the Parking Garage:  Take elevators to the1st floor of the parking garage.  Continue past the gift shop, coffee shop, and piano.  Take a right and go to the gold elevators. (Elevator B)  Take the elevator to the 3rd floor.  Follow the arrow on the sign on the wall that says Cath Lab Registration/EP Lab Registration.  Follow the long hallway all the way around until you come to a big open area.  This is the registration area.  Check in at Reception Desk.    OR    If family is dropping you off:  Have them drop you off at the front of the Hospital under the green overhang.  Enter through the doors and take a right.  Take the E elevators to the 3rd floor Cardiology Waiting Area.  Check in at the Reception Desk in the waiting room.

## 2017-11-20 ENCOUNTER — HOSPITAL ENCOUNTER (OUTPATIENT)
Facility: HOSPITAL | Age: 77
Discharge: HOME | End: 2017-11-21
Attending: INTERNAL MEDICINE | Admitting: INTERNAL MEDICINE
Payer: MEDICARE

## 2017-11-20 ENCOUNTER — SURGERY (OUTPATIENT)
Age: 77
End: 2017-11-20

## 2017-11-20 DIAGNOSIS — I73.9 PAD (PERIPHERAL ARTERY DISEASE): Primary | ICD-10-CM

## 2017-11-20 DIAGNOSIS — I25.10 CORONARY ARTERY DISEASE INVOLVING NATIVE CORONARY ARTERY OF NATIVE HEART WITHOUT ANGINA PECTORIS: ICD-10-CM

## 2017-11-20 DIAGNOSIS — Z98.61 S/P PTCA (PERCUTANEOUS TRANSLUMINAL CORONARY ANGIOPLASTY): Primary | ICD-10-CM

## 2017-11-20 LAB
ABO + RH BLD: NORMAL
ANION GAP SERPL CALC-SCNC: 8 MMOL/L
BASOPHILS # BLD AUTO: 0.03 K/UL
BASOPHILS NFR BLD: 0.4 %
BLD GP AB SCN CELLS X3 SERPL QL: NORMAL
BUN SERPL-MCNC: 18 MG/DL
CALCIUM SERPL-MCNC: 9.6 MG/DL
CHLORIDE SERPL-SCNC: 107 MMOL/L
CO2 SERPL-SCNC: 28 MMOL/L
CREAT SERPL-MCNC: 0.8 MG/DL
DIFFERENTIAL METHOD: ABNORMAL
EOSINOPHIL # BLD AUTO: 0.4 K/UL
EOSINOPHIL NFR BLD: 4.8 %
ERYTHROCYTE [DISTWIDTH] IN BLOOD BY AUTOMATED COUNT: 12.4 %
EST. GFR  (AFRICAN AMERICAN): >60 ML/MIN/1.73 M^2
EST. GFR  (NON AFRICAN AMERICAN): >60 ML/MIN/1.73 M^2
GLUCOSE SERPL-MCNC: 95 MG/DL
HCT VFR BLD AUTO: 39.7 %
HGB BLD-MCNC: 13.3 G/DL
IMM GRANULOCYTES # BLD AUTO: 0.02 K/UL
IMM GRANULOCYTES NFR BLD AUTO: 0.3 %
INR PPP: 1
LYMPHOCYTES # BLD AUTO: 1.7 K/UL
LYMPHOCYTES NFR BLD: 22.4 %
MCH RBC QN AUTO: 33 PG
MCHC RBC AUTO-ENTMCNC: 33.5 G/DL
MCV RBC AUTO: 99 FL
MONOCYTES # BLD AUTO: 0.7 K/UL
MONOCYTES NFR BLD: 9.7 %
NEUTROPHILS # BLD AUTO: 4.6 K/UL
NEUTROPHILS NFR BLD: 62.4 %
NRBC BLD-RTO: 0 /100 WBC
PLATELET # BLD AUTO: 176 K/UL
PMV BLD AUTO: 8.8 FL
POTASSIUM SERPL-SCNC: 4.4 MMOL/L
PROTHROMBIN TIME: 10.7 SEC
RBC # BLD AUTO: 4.03 M/UL
SODIUM SERPL-SCNC: 143 MMOL/L
WBC # BLD AUTO: 7.44 K/UL

## 2017-11-20 PROCEDURE — 25000003 PHARM REV CODE 250

## 2017-11-20 PROCEDURE — 86900 BLOOD TYPING SEROLOGIC ABO: CPT

## 2017-11-20 PROCEDURE — 99152 MOD SED SAME PHYS/QHP 5/>YRS: CPT | Mod: ,,, | Performed by: INTERNAL MEDICINE

## 2017-11-20 PROCEDURE — 85610 PROTHROMBIN TIME: CPT

## 2017-11-20 PROCEDURE — 25000003 PHARM REV CODE 250: Performed by: INTERNAL MEDICINE

## 2017-11-20 PROCEDURE — C1769 GUIDE WIRE: HCPCS

## 2017-11-20 PROCEDURE — 37221 CATH LAB PROCEDURE: CPT | Mod: 50,51,, | Performed by: INTERNAL MEDICINE

## 2017-11-20 PROCEDURE — 63600175 PHARM REV CODE 636 W HCPCS

## 2017-11-20 PROCEDURE — S0077 INJECTION, CLINDAMYCIN PHOSP: HCPCS

## 2017-11-20 PROCEDURE — 80048 BASIC METABOLIC PNL TOTAL CA: CPT

## 2017-11-20 PROCEDURE — 85025 COMPLETE CBC W/AUTO DIFF WBC: CPT

## 2017-11-20 PROCEDURE — 93005 ELECTROCARDIOGRAM TRACING: CPT | Mod: 59

## 2017-11-20 PROCEDURE — 92928 PRQ TCAT PLMT NTRAC ST 1 LES: CPT | Mod: LD,,, | Performed by: INTERNAL MEDICINE

## 2017-11-20 PROCEDURE — 93010 ELECTROCARDIOGRAM REPORT: CPT | Mod: ,,, | Performed by: INTERNAL MEDICINE

## 2017-11-20 PROCEDURE — 93010 ELECTROCARDIOGRAM REPORT: CPT | Mod: 76,,, | Performed by: INTERNAL MEDICINE

## 2017-11-20 PROCEDURE — 86850 RBC ANTIBODY SCREEN: CPT

## 2017-11-20 RX ORDER — CLOPIDOGREL BISULFATE 75 MG/1
75 TABLET ORAL DAILY
Status: DISCONTINUED | OUTPATIENT
Start: 2017-11-20 | End: 2017-11-20

## 2017-11-20 RX ORDER — AMLODIPINE BESYLATE 10 MG/1
10 TABLET ORAL DAILY
Status: DISCONTINUED | OUTPATIENT
Start: 2017-11-20 | End: 2017-11-21 | Stop reason: HOSPADM

## 2017-11-20 RX ORDER — ATORVASTATIN CALCIUM 20 MG/1
40 TABLET, FILM COATED ORAL DAILY
Status: DISCONTINUED | OUTPATIENT
Start: 2017-11-20 | End: 2017-11-21 | Stop reason: HOSPADM

## 2017-11-20 RX ORDER — FLUTICASONE PROPIONATE 50 MCG
1 SPRAY, SUSPENSION (ML) NASAL DAILY
Status: DISCONTINUED | OUTPATIENT
Start: 2017-11-21 | End: 2017-11-21 | Stop reason: HOSPADM

## 2017-11-20 RX ORDER — DIPHENHYDRAMINE HCL 50 MG
50 CAPSULE ORAL ONCE
Status: COMPLETED | OUTPATIENT
Start: 2017-11-20 | End: 2017-11-20

## 2017-11-20 RX ORDER — SODIUM CHLORIDE 9 MG/ML
3 INJECTION, SOLUTION INTRAVENOUS CONTINUOUS
Status: ACTIVE | OUTPATIENT
Start: 2017-11-20 | End: 2017-11-20

## 2017-11-20 RX ORDER — CARVEDILOL 6.25 MG/1
6.25 TABLET ORAL 2 TIMES DAILY WITH MEALS
Status: DISCONTINUED | OUTPATIENT
Start: 2017-11-20 | End: 2017-11-21 | Stop reason: HOSPADM

## 2017-11-20 RX ORDER — ASPIRIN 81 MG/1
81 TABLET ORAL 2 TIMES DAILY
Status: DISCONTINUED | OUTPATIENT
Start: 2017-11-20 | End: 2017-11-21 | Stop reason: HOSPADM

## 2017-11-20 RX ADMIN — TICAGRELOR 90 MG: 90 TABLET ORAL at 06:11

## 2017-11-20 RX ADMIN — CARVEDILOL 6.25 MG: 6.25 TABLET, FILM COATED ORAL at 06:11

## 2017-11-20 RX ADMIN — ATORVASTATIN CALCIUM 40 MG: 20 TABLET, FILM COATED ORAL at 04:11

## 2017-11-20 RX ADMIN — SODIUM CHLORIDE 3 ML/KG/HR: 0.9 INJECTION, SOLUTION INTRAVENOUS at 02:11

## 2017-11-20 RX ADMIN — ASPIRIN 81 MG: 81 TABLET, COATED ORAL at 08:11

## 2017-11-20 RX ADMIN — DIPHENHYDRAMINE HYDROCHLORIDE 50 MG: 50 CAPSULE ORAL at 10:11

## 2017-11-20 RX ADMIN — AMLODIPINE BESYLATE 10 MG: 10 TABLET ORAL at 04:11

## 2017-11-20 RX ADMIN — SODIUM CHLORIDE 3 ML/KG/HR: 0.9 INJECTION, SOLUTION INTRAVENOUS at 10:11

## 2017-11-20 RX ADMIN — TICAGRELOR 90 MG: 90 TABLET ORAL at 04:11

## 2017-11-20 NOTE — INTERVAL H&P NOTE
The patient has been examined and the H&P has been reviewed:    I concur with the findings and no changes have occurred since H&P was written.   L CFA and CFV access. Crossover sheath. R EIA via crossover sheath.     Anesthesia/Surgery risks, benefits and alternative options discussed and understood by patient/family.          Active Hospital Problems    Diagnosis  POA    Coronary artery disease involving native coronary artery of native heart without angina pectoris [I25.10]  Yes      Resolved Hospital Problems    Diagnosis Date Resolved POA   No resolved problems to display.

## 2017-11-20 NOTE — CONSULTS
"Cardiac Rehab     Gal Waller   [id number]   11/20/2017       Activity taught: Yes    Cardiac Rehab Phase Taught: Phase I & II    Risk Factors-Modifiable: nutrition, hypertension, sedentary lifestyle, stress, exercise    Risk Factors-Non modifiable: age, gender    Teaching Method: Verbal, Written and Living with Heart Disease book.    Understanding/Response: Pt verbalized understanding, all questions answered. Pt understands their cardiac rehab order is good for 12 months.   Pt given order for [facility] Cardiac REhab    Comments: S/P PTCA. Discussed cardiac rehab and risk factor modification. Team to refer patient to [facility] Cardiac Rehab Phase II. Educational materials were used in the process and given to the patient. They included "Your Guide to Living with Heart Disease", Phase Two Cardiac Rehabilitation information along with a sample Mediterranean diet.The patient expressed understanding of the teaching and expressed desire to take a role in modifying the risk factors when they return home.    MARYSE Liu RN  Cardiac Rehab Nurse      "

## 2017-11-20 NOTE — H&P (VIEW-ONLY)
"Subjective:    Patient ID:  Gal Waller is a 77 y.o. male who presents for follow-up of multivessel CAD.    Referring Physician: Everette Lira MD    HPI    Mr. Waller is a 78 yo Male with PMHx of HTN, Dyslipidemia, PAD s/p remote bilateral PTA (reports RLE PTA in 1981 and LLE PTA 2001) with samira IIb claudication symptoms of bilateral LExt with recent diagnosis of multivessel CAD including distal LM/prox LAD and mLCx with ostial RCA subtotal occlusion referred for assessment for coronary PCI by Dr. Lira. Considering his distal LAD lesion, PCI is suggested for Mr. Waller.    The patient currently denies CP and SOB.  He says he gets pain in the posterior thighs and buttocks over the last year such that he can walk 2 blocks before he gets pain in the mid-back, posterior thighs and buttocks. He denies calf pain, CP and rests for one minute before resuming walking. The patient will get LOZA and reports bilateral toe tingling at rest for the last year. He denies poikilothermia and rest pain. He denies wounds and ulcers on his toes but reports right groin "knot like" structure which he noticed recently. He has quit smoking 1 week ago.      Cardiac Data:  [facility] Angiographic Results Diagnostic 10/23/17:      - Left Main Coronary Artery:             The distal LM has a 75% stenosis. There is KARSTEN 3 flow.     - Left Anterior Descending Artery:             The proximal LAD has a 90% stenosis. There is KARSTEN 3 flow.     - Left Circumflex Artery:             The mid LCX has a 70% stenosis. There is KARSTEN 3 flow.     - Right Coronary Artery:             The ostial RCA has subtotal (99). There is KARSTEN 3 flow.    PET stress 10/20/17:  CONCLUSIONS: ABNORMAL MYOCARDIAL PERFUSION PET STRESS TEST  1. There is a moderate sized mild intensity fixed defect consistent with non-transmural infarct in the base to apical inferior wall in the usual distribution of the Right Coronary Artery. This defect comprises 15 % of the left " ventricular myocardium.   2. There is a small to moderate sized mild ischemia in the anteroapical wall in the usual distribution of the mid Left Anterior Descending Artery and D2. This defect comprises 12 % of the left ventricular myocardium.   3. There is abnormal wall motion at rest showing mild global hypokinesis of the left ventricle. hypokinesis of the inferior wall of the left ventricle. There is abnormal wall motion at stress showing moderate global hypokinesis of the left ventricle.   hypokinesis of the apical and inferior walls of the left ventricle.   4. Resting LV function is 40 % and significantly decreased to 28 % at stress.  (normal is >= 51%)  5. The ventricular volumes are normal at rest and stress.   6. The extracardiac distribution of radioactivity is normal    Review of Systems   Constitution: Negative for chills, decreased appetite, fever, weakness, night sweats, weight gain and weight loss.   HENT: Negative for congestion, hoarse voice, stridor and tinnitus.    Eyes: Negative for blurred vision, pain and visual disturbance.   Cardiovascular: Positive for claudication and dyspnea on exertion. Negative for chest pain, irregular heartbeat, leg swelling, near-syncope, orthopnea, palpitations, paroxysmal nocturnal dyspnea and syncope.   Respiratory: Negative for cough, hemoptysis, shortness of breath, snoring and wheezing.    Endocrine: Negative for cold intolerance, heat intolerance and polyuria.   Hematologic/Lymphatic: Negative for bleeding problem. Does not bruise/bleed easily.   Skin: Negative for color change and rash.   Musculoskeletal: Negative for arthritis, back pain, joint pain, muscle cramps, myalgias and stiffness.   Gastrointestinal: Negative for abdominal pain, anorexia, constipation, diarrhea, dysphagia, heartburn, hemorrhoids, melena, nausea and vomiting.   Genitourinary: Negative for frequency, hematuria, hesitancy, nocturia and urgency.   Neurological: Negative for difficulty with  concentration, dizziness, focal weakness, headaches, light-headedness, numbness, seizures, tremors and vertigo.   Psychiatric/Behavioral: Negative for altered mental status and depression. The patient does not have insomnia.    Allergic/Immunologic: Negative for hives.        Objective:    Physical Exam   Constitutional: He is oriented to person, place, and time. He appears well-developed and well-nourished.   HENT:   Head: Normocephalic and atraumatic.   Eyes: Conjunctivae are normal. Pupils are equal, round, and reactive to light.   Neck: Normal range of motion. No JVD present. No tracheal deviation present. No thyromegaly present.   Cardiovascular: Regular rhythm, S1 normal, S2 normal, normal heart sounds and intact distal pulses.  Exam reveals no S3.    No murmur heard.  Pulses:       Carotid pulses are 2+ on the right side, and 2+ on the left side.       Radial pulses are 2+ on the right side, and 2+ on the left side.        Femoral pulses are 2+ on the right side, and 2+ on the left side.       Dorsalis pedis pulses are 1+ on the right side.        Posterior tibial pulses are 1+ on the right side, and 1+ on the left side.   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   Pulmonary/Chest: Effort normal and breath sounds normal. No stridor. No respiratory distress. He has no wheezes. He has no rales.   Abdominal: Soft. Bowel sounds are normal. He exhibits no distension. There is no tenderness.   Musculoskeletal: Normal range of motion. He exhibits no edema or tenderness.   Neurological: He is alert and oriented to person, place, and time.   Skin: Skin is warm and dry. He is not diaphoretic. No erythema.   Psychiatric: He has a normal mood and affect.        Assessment:       1. Coronary artery disease involving native coronary artery of native heart without angina pectoris    2. PAD (peripheral artery disease) - Bilateral supra and infra renal aortic plaque, Right EIA 60% and left AT occlusion.    3. Hypertension,  uncontrolled   4.      Smoking - quit a week ago  5.      Dyslipidemia - on low intensity statin - increased it to 40 mg po daily      Plan:     Will proceed with high risk unprotected PCI of LM/LAD and RCA. Considering small caliber LCx -  Will treat LCx medically.    Considering right groin aneurysm - will obtain Left CFA access. 6Fr long sheath as has left EIA disease/tourtoisity.  CFV access for TVP during procedure - as we are going to intervene on ostial RCA  LM->LAD rotablation followed by stenting - NAZ candidate. EBU 3.5 catheter  RCA - guide catheter - likely JR but will reassess  Will perform right EIA/CFA re-look angiogram +/- PTA    - Plan for LHC (NAZ candidate)  - L CFA 6 Fr   - DAPT - plavix prescription sent to his pharmacy  - Hold Eliquis 3 days pre procedure  - Keep NPO   - Pre-cath IVF ordered  -The risks, benefits and alternatives of the procedure were explained to the patient.   -The risks of coronary angiography include but are not limited to: bleeding, infection, heart rhythm abnormalities, allergic reactions, kidney injury, stroke and death.   -The risks of moderate sedation include hypotension, respiratory depression, arrhythmias, bronchospasm, and death.   - Informed consent was obtained and the patient is agreeable to proceed with the procedure.    For uncontrolled HTN - increased dose of norvasc to 10 mg po daily.  For dyslipidemia - increased dose of lipitor to 40 mg po daily.    Jose Dillon MD  PGY-7  Interventional Cardiology

## 2017-11-21 VITALS
HEIGHT: 69 IN | OXYGEN SATURATION: 99 % | HEART RATE: 70 BPM | TEMPERATURE: 98 F | RESPIRATION RATE: 20 BRPM | WEIGHT: 170 LBS | BODY MASS INDEX: 25.18 KG/M2 | DIASTOLIC BLOOD PRESSURE: 82 MMHG | SYSTOLIC BLOOD PRESSURE: 149 MMHG

## 2017-11-21 LAB
ANION GAP SERPL CALC-SCNC: 9 MMOL/L
BASOPHILS # BLD AUTO: 0.03 K/UL
BASOPHILS NFR BLD: 0.3 %
BUN SERPL-MCNC: 14 MG/DL
CALCIUM SERPL-MCNC: 9.5 MG/DL
CHLORIDE SERPL-SCNC: 106 MMOL/L
CO2 SERPL-SCNC: 25 MMOL/L
CORONARY STENOSIS: ABNORMAL
CORONARY STENT: YES
CREAT SERPL-MCNC: 0.8 MG/DL
DIFFERENTIAL METHOD: ABNORMAL
EOSINOPHIL # BLD AUTO: 0.3 K/UL
EOSINOPHIL NFR BLD: 2.3 %
ERYTHROCYTE [DISTWIDTH] IN BLOOD BY AUTOMATED COUNT: 12.4 %
EST. GFR  (AFRICAN AMERICAN): >60 ML/MIN/1.73 M^2
EST. GFR  (NON AFRICAN AMERICAN): >60 ML/MIN/1.73 M^2
GLUCOSE SERPL-MCNC: 93 MG/DL
HCT VFR BLD AUTO: 38.1 %
HGB BLD-MCNC: 13.5 G/DL
IMM GRANULOCYTES # BLD AUTO: 0.03 K/UL
IMM GRANULOCYTES NFR BLD AUTO: 0.3 %
LYMPHOCYTES # BLD AUTO: 1.3 K/UL
LYMPHOCYTES NFR BLD: 11.2 %
MCH RBC QN AUTO: 33.8 PG
MCHC RBC AUTO-ENTMCNC: 35.4 G/DL
MCV RBC AUTO: 96 FL
MONOCYTES # BLD AUTO: 1.1 K/UL
MONOCYTES NFR BLD: 9.9 %
NEUTROPHILS # BLD AUTO: 8.8 K/UL
NEUTROPHILS NFR BLD: 76 %
NRBC BLD-RTO: 0 /100 WBC
PERIPHERAL STENOSIS: ABNORMAL
PLATELET # BLD AUTO: 170 K/UL
PMV BLD AUTO: 9 FL
POC ACTIVATED CLOTTING TIME K: 257 SEC (ref 74–137)
POTASSIUM SERPL-SCNC: 4.1 MMOL/L
RBC # BLD AUTO: 3.99 M/UL
SAMPLE: ABNORMAL
SODIUM SERPL-SCNC: 140 MMOL/L
WBC # BLD AUTO: 11.51 K/UL

## 2017-11-21 PROCEDURE — 80048 BASIC METABOLIC PNL TOTAL CA: CPT

## 2017-11-21 PROCEDURE — 25000003 PHARM REV CODE 250: Performed by: INTERNAL MEDICINE

## 2017-11-21 PROCEDURE — 36415 COLL VENOUS BLD VENIPUNCTURE: CPT

## 2017-11-21 PROCEDURE — 85025 COMPLETE CBC W/AUTO DIFF WBC: CPT

## 2017-11-21 RX ADMIN — ASPIRIN 81 MG: 81 TABLET, COATED ORAL at 08:11

## 2017-11-21 RX ADMIN — CARVEDILOL 6.25 MG: 6.25 TABLET, FILM COATED ORAL at 08:11

## 2017-11-21 RX ADMIN — AMLODIPINE BESYLATE 10 MG: 10 TABLET ORAL at 08:11

## 2017-11-21 RX ADMIN — ATORVASTATIN CALCIUM 40 MG: 20 TABLET, FILM COATED ORAL at 08:11

## 2017-11-21 RX ADMIN — TICAGRELOR 90 MG: 90 TABLET ORAL at 08:11

## 2017-11-21 NOTE — DISCHARGE SUMMARY
Discharge Summary  Interventional Cardiology      Admit Date: 11/20/2017    Discharge Date:  11/21/2017    Attending Physician: John Small MD    Discharge Physician: Jose Dillon MD    Principal Diagnoses: CCS II angina with multivessel disease  Indication for Admission: Stent NAZ coronary (N/A), PTA-PERIPHERAL (N/A)    Discharged Condition: Good    Hospital Course:   76 yo Male with PMHx of HTN, Dyslipidemia, PAD s/p remote bilateral PTA referred by Dr. Lira for multivesel PCI and PTAS s/p Unable to cross RCA proximal . LM/LAD PCI with NAZ performed without any issues. prox LAD 3.5 x 22 and LM with 4 x 12 mm NAZ. Right EIA PTAS done with 10 x 60 SES with post dilation with 10 x 20 mm baloon.    Outpatient Plan:  Cardiac rehab referral, Smoking cessation counseling, Continue medical management, Risk factor reduction  - Switching to Ticagrelor 90 mg po BID for atleast 6 months in addition to life long ASA 81 mg po daily.  - Continue with lipitor 40 mg po daily.  Diet: Cardiac diet    Activity: Ad gagan    Disposition: Home or Self Care    Discharge Medications:      Medication List      START taking these medications    ticagrelor 90 mg tablet  Commonly known as:  BRILINTA  Take 1 tablet (90 mg total) by mouth 2 (two) times daily.        CONTINUE taking these medications    amLODIPine 10 MG tablet  Commonly known as:  NORVASC  Take 1 tablet (10 mg total) by mouth once daily.     aspirin 81 MG EC tablet  Commonly known as:  ECOTRIN     atorvastatin 40 MG tablet  Commonly known as:  LIPITOR  Take 1 tablet (40 mg total) by mouth once daily.     carvedilol 6.25 MG tablet  Commonly known as:  COREG  Take 1 tablet (6.25 mg total) by mouth 2 (two) times daily with meals.     fluticasone 50 mcg/actuation nasal spray  Commonly known as:  FLONASE     VITAMIN C 100 MG tablet  Generic drug:  ascorbic acid (vitamin C)        STOP taking these medications    apixaban 5 mg Tab     clopidogrel 75 mg tablet  Commonly known  as:  PLAVIX           Where to Get Your Medications      [id number] [place] - [address] AT [facility] [address]  [address] [address]    Phone:  [phone]   · ticagrelor 90 mg tablet

## 2017-11-22 DIAGNOSIS — I25.10 CORONARY ARTERY DISEASE, ANGINA PRESENCE UNSPECIFIED, UNSPECIFIED VESSEL OR LESION TYPE, UNSPECIFIED WHETHER NATIVE OR TRANSPLANTED HEART: Primary | ICD-10-CM

## 2018-04-09 ENCOUNTER — HOSPITAL ENCOUNTER (OUTPATIENT)
Dept: CARDIOLOGY | Facility: CLINIC | Age: 78
Discharge: HOME | End: 2018-04-09
Attending: INTERNAL MEDICINE
Payer: MEDICARE

## 2018-04-09 DIAGNOSIS — I25.10 CORONARY ARTERY DISEASE, ANGINA PRESENCE UNSPECIFIED, UNSPECIFIED VESSEL OR LESION TYPE, UNSPECIFIED WHETHER NATIVE OR TRANSPLANTED HEART: ICD-10-CM

## 2018-04-09 LAB
DIASTOLIC DYSFUNCTION: NO
ESTIMATED PA SYSTOLIC PRESSURE: 26.04
MITRAL VALVE MOBILITY: NORMAL
MITRAL VALVE REGURGITATION: NORMAL
RETIRED EF AND QEF - SEE NOTES: 55 (ref 55–65)
TRICUSPID VALVE REGURGITATION: NORMAL

## 2018-04-09 PROCEDURE — 93306 TTE W/DOPPLER COMPLETE: CPT | Mod: S$GLB,,, | Performed by: INTERNAL MEDICINE

## 2018-04-11 ENCOUNTER — OFFICE VISIT (OUTPATIENT)
Dept: CARDIOLOGY | Facility: CLINIC | Age: 78
End: 2018-04-11
Payer: MEDICARE

## 2018-04-11 VITALS
HEIGHT: 70 IN | BODY MASS INDEX: 22.79 KG/M2 | SYSTOLIC BLOOD PRESSURE: 125 MMHG | HEART RATE: 58 BPM | OXYGEN SATURATION: 100 % | DIASTOLIC BLOOD PRESSURE: 58 MMHG | WEIGHT: 159.19 LBS

## 2018-04-11 DIAGNOSIS — I10 HYPERTENSION, UNSPECIFIED TYPE: ICD-10-CM

## 2018-04-11 DIAGNOSIS — I25.10 CORONARY ARTERY DISEASE, ANGINA PRESENCE UNSPECIFIED, UNSPECIFIED VESSEL OR LESION TYPE, UNSPECIFIED WHETHER NATIVE OR TRANSPLANTED HEART: Primary | ICD-10-CM

## 2018-04-11 DIAGNOSIS — Z98.61 S/P PTCA (PERCUTANEOUS TRANSLUMINAL CORONARY ANGIOPLASTY): ICD-10-CM

## 2018-04-11 DIAGNOSIS — I73.9 PAD (PERIPHERAL ARTERY DISEASE): ICD-10-CM

## 2018-04-11 PROCEDURE — 3078F DIAST BP <80 MM HG: CPT | Mod: CPTII,S$GLB,, | Performed by: INTERNAL MEDICINE

## 2018-04-11 PROCEDURE — 3074F SYST BP LT 130 MM HG: CPT | Mod: CPTII,S$GLB,, | Performed by: INTERNAL MEDICINE

## 2018-04-11 PROCEDURE — 99999 PR PBB SHADOW E&M-EST. PATIENT-LVL III: CPT | Mod: PBBFAC,,, | Performed by: INTERNAL MEDICINE

## 2018-04-11 PROCEDURE — 99213 OFFICE O/P EST LOW 20 MIN: CPT | Mod: S$GLB,,, | Performed by: INTERNAL MEDICINE

## 2018-04-11 NOTE — PROGRESS NOTES
Subjective:    Patient ID:  Gal Waller is a 78 y.o. male who presents for follow-up of CAD.    HPI  Patient underwent multivessel PCI 11/20/2017 ( LM,LAD) and attempted RCA that was unsuccessful (ostial ) along with right          external iliac stenting.  Presently not having any chest pain. Undergoing work up for anemia and GI symptoms.    Review of Systems   Constitution: Negative for chills, decreased appetite and fever.   Cardiovascular: Negative for chest pain, claudication, cyanosis, dyspnea on exertion, irregular heartbeat, leg swelling, near-syncope, orthopnea, palpitations, paroxysmal nocturnal dyspnea and syncope.   Respiratory: Negative for cough, shortness of breath and wheezing.    Musculoskeletal: Positive for back pain.         Objective:    Physical Exam   Constitutional: He is oriented to person, place, and time. He appears well-developed and well-nourished. No distress.   Neck: Neck supple.   Cardiovascular: Normal rate, regular rhythm and intact distal pulses.    Murmur heard.  Pulmonary/Chest: Effort normal. No respiratory distress. He has no wheezes. He has no rales.   Abdominal: Soft.   Neurological: He is alert and oriented to person, place, and time.   Skin: Skin is warm and dry. He is not diaphoretic.   Multiple bruises.         Assessment:       1. Coronary artery disease, angina presence unspecified, unspecified vessel or lesion type, unspecified whether native or transplanted heart    2. S/P PTCA (percutaneous transluminal coronary angioplasty)    3. PAD (peripheral artery disease)    4. Hypertension, unspecified type         Plan:       1.  Nuclear stress for follow up prior intervention.  2   Patient can stop his dual antiplatelet therapy  5 to 7 days before his colonoscopy and resume post for a total of 1 year (11/2018).  3.  Will arrange follow up after stress test.  Unlikely to re-attempt intervention of the RCA.

## 2018-04-16 ENCOUNTER — CLINICAL SUPPORT (OUTPATIENT)
Dept: CARDIOLOGY | Facility: CLINIC | Age: 78
End: 2018-04-16
Attending: INTERNAL MEDICINE
Payer: MEDICARE

## 2018-04-16 DIAGNOSIS — I25.10 CORONARY ARTERY DISEASE, ANGINA PRESENCE UNSPECIFIED, UNSPECIFIED VESSEL OR LESION TYPE, UNSPECIFIED WHETHER NATIVE OR TRANSPLANTED HEART: ICD-10-CM

## 2018-04-16 PROCEDURE — 78452 HT MUSCLE IMAGE SPECT MULT: CPT | Mod: S$GLB,,, | Performed by: INTERNAL MEDICINE

## 2018-04-16 PROCEDURE — 93015 CV STRESS TEST SUPVJ I&R: CPT | Mod: S$GLB,,, | Performed by: INTERNAL MEDICINE

## 2018-04-16 PROCEDURE — A9502 TC99M TETROFOSMIN: HCPCS | Mod: S$GLB,,, | Performed by: INTERNAL MEDICINE

## 2018-04-17 ENCOUNTER — TELEPHONE (OUTPATIENT)
Dept: CARDIOLOGY | Facility: CLINIC | Age: 78
End: 2018-04-17

## 2018-04-17 NOTE — TELEPHONE ENCOUNTER
Called patient with results of stress test. He asked that I also call his wife. I called the wife and gave her the results. Both verbalized understanding.

## 2018-05-03 DIAGNOSIS — I25.10 CORONARY ARTERY DISEASE, ANGINA PRESENCE UNSPECIFIED, UNSPECIFIED VESSEL OR LESION TYPE, UNSPECIFIED WHETHER NATIVE OR TRANSPLANTED HEART: Primary | ICD-10-CM

## 2018-05-03 RX ORDER — CLOPIDOGREL BISULFATE 75 MG/1
75 TABLET ORAL DAILY
Qty: 30 TABLET | Refills: 12 | Status: SHIPPED | OUTPATIENT
Start: 2018-05-03 | End: 2019-05-26 | Stop reason: SDUPTHER

## 2018-05-03 RX ORDER — CLOPIDOGREL BISULFATE 75 MG/1
75 TABLET ORAL DAILY
COMMUNITY
End: 2018-05-03 | Stop reason: SDUPTHER

## 2018-05-03 NOTE — TELEPHONE ENCOUNTER
"Patient called stating that while taking the Bilinta he felt his legs were "wobbly". He stopped the Brilinta for a colonoscopy and he stated that his legs got better. He restarted the Brilinta after the colonoscopy and his legs got wobbly again. Dr. Small reviewed the chart and orders given for patient to start Plavix 75mg PO qday. I called the patient and gave him these instructions. Prescription sent to patients pharmacy.  "

## 2018-06-07 ENCOUNTER — OFFICE VISIT (OUTPATIENT)
Dept: CARDIOLOGY | Facility: CLINIC | Age: 78
End: 2018-06-07
Payer: MEDICARE

## 2018-06-07 VITALS
BODY MASS INDEX: 22.82 KG/M2 | HEIGHT: 70 IN | DIASTOLIC BLOOD PRESSURE: 72 MMHG | WEIGHT: 159.38 LBS | SYSTOLIC BLOOD PRESSURE: 160 MMHG | HEART RATE: 68 BPM

## 2018-06-07 DIAGNOSIS — I25.10 CORONARY ARTERY DISEASE WITHOUT ANGINA PECTORIS, UNSPECIFIED VESSEL OR LESION TYPE, UNSPECIFIED WHETHER NATIVE OR TRANSPLANTED HEART: Primary | ICD-10-CM

## 2018-06-07 DIAGNOSIS — E78.5 HYPERLIPIDEMIA, UNSPECIFIED HYPERLIPIDEMIA TYPE: ICD-10-CM

## 2018-06-07 DIAGNOSIS — I10 HYPERTENSION, UNSPECIFIED TYPE: ICD-10-CM

## 2018-06-07 DIAGNOSIS — Z72.0 TOBACCO ABUSE: ICD-10-CM

## 2018-06-07 PROCEDURE — 99999 PR PBB SHADOW E&M-EST. PATIENT-LVL IV: CPT | Mod: PBBFAC,GC,, | Performed by: INTERNAL MEDICINE

## 2018-06-07 PROCEDURE — 3078F DIAST BP <80 MM HG: CPT | Mod: CPTII,GC,S$GLB, | Performed by: INTERNAL MEDICINE

## 2018-06-07 PROCEDURE — 99213 OFFICE O/P EST LOW 20 MIN: CPT | Mod: GC,S$GLB,, | Performed by: INTERNAL MEDICINE

## 2018-06-07 PROCEDURE — 3077F SYST BP >= 140 MM HG: CPT | Mod: CPTII,GC,S$GLB, | Performed by: INTERNAL MEDICINE

## 2018-06-07 RX ORDER — ATORVASTATIN CALCIUM 40 MG/1
80 TABLET, FILM COATED ORAL DAILY
Qty: 180 TABLET | Refills: 3 | Status: SHIPPED | OUTPATIENT
Start: 2018-06-07 | End: 2018-07-16 | Stop reason: DRUGHIGH

## 2018-06-07 NOTE — PROGRESS NOTES
"    Cardiology Clinic Note  Reason for Visit: CAD    HPI:   79 y/o male who has a PMH of PAD s/p right MARGARITO and EIA stenting 11/2017, CAD s/p NAZ to dLM and pLAD (oRCA  unable to be crossed with 99% stenosis), and anemia s/p CScope (reportedly with 6 polyps with removal with f/u in three years for another CScope planned) who presents as a new patient. He is on DAPT thru 11/2018. A post-PCI SPECT was ordered and resulted as a small amount of mild myocardial ischemia in the basilar inferoseptal wall of the LV. Dr. Small noted prior to the SPECT that he unlikely to re-attempt intervention of the RCA. He denies claudication and CP. He reports LOZA "once in awhile when I try to walk fast". He is currently smoking 6 cigarettes daily. His SBP at home is in the 150s. He misses his evening medication "sometimes". The patient walks around the house.    TTE 4/2018     1 - Normal left ventricular systolic function (EF 55-60%).     2 - Normal left ventricular diastolic function.     3 - Normal right ventricular systolic function .     4 - Mild mitral regurgitation.     5 - Mild tricuspid regurgitation.     Review of Systems   Constitution: Negative for chills and fever.   HENT: Negative for ear discharge.    Eyes: Negative for pain and visual disturbance.   Cardiovascular: Negative for chest pain, dyspnea on exertion, irregular heartbeat, leg swelling, orthopnea, palpitations, paroxysmal nocturnal dyspnea and syncope.   Respiratory: Positive for shortness of breath. Negative for hemoptysis and wheezing.    Skin: Negative for rash and suspicious lesions.   Musculoskeletal: Negative for joint pain and muscle weakness.   Gastrointestinal: Negative for abdominal pain, diarrhea, hematemesis, hematochezia, melena, nausea and vomiting.   Genitourinary: Negative for dysuria and frequency.   Neurological: Negative for focal weakness, headaches and tremors.   Psychiatric/Behavioral: Negative for altered mental status, suicidal ideas " and thoughts of violence.       PMH:   History reviewed. No pertinent past medical history.  History reviewed. No pertinent surgical history.  Allergies:     Review of patient's allergies indicates:   Allergen Reactions    Pcn [penicillins] Swelling     Medications:     Current Outpatient Prescriptions on File Prior to Visit   Medication Sig Dispense Refill    amLODIPine (NORVASC) 10 MG tablet Take 1 tablet (10 mg total) by mouth once daily. 30 tablet 11    ascorbic acid, vitamin C, (VITAMIN C) 100 MG tablet Take 100 mg by mouth once daily.      aspirin (ECOTRIN) 81 MG EC tablet Take 81 mg by mouth 2 (two) times daily.      atorvastatin (LIPITOR) 40 MG tablet Take 1 tablet (40 mg total) by mouth once daily. 90 tablet 3    carvedilol (COREG) 6.25 MG tablet Take 1 tablet (6.25 mg total) by mouth 2 (two) times daily with meals. 180 tablet 3    clopidogrel (PLAVIX) 75 mg tablet Take 1 tablet (75 mg total) by mouth once daily. 30 tablet 12    fluticasone (FLONASE) 50 mcg/actuation nasal spray        No current facility-administered medications on file prior to visit.      Social History:     Social History   Substance Use Topics    Smoking status: Former Smoker     Quit date: 10/9/2017    Smokeless tobacco: Never Used    Alcohol use No     Family History:   History reviewed. No pertinent family history.    Physical Exam   Constitutional: He is oriented to person, place, and time. He appears well-developed and well-nourished.   HENT:   Head: Normocephalic and atraumatic.   Eyes: EOM are normal.   Cardiovascular: Normal rate and regular rhythm.  Exam reveals no gallop and no friction rub.    Pulmonary/Chest: Effort normal and breath sounds normal. No stridor. He has no wheezes. He has no rales.   Abdominal: Soft. Bowel sounds are normal. There is no rebound and no guarding.   Musculoskeletal: He exhibits no edema.   Neurological: He is alert and oriented to person, place, and time. No cranial nerve deficit.  "  Skin: Skin is warm and dry.   Psychiatric: He has a normal mood and affect. His behavior is normal.     BP (!) 160/72 (BP Location: Left arm, Patient Position: Sitting, BP Method: Medium (Automatic))   Pulse 68   Ht 5' 9.5" (1.765 m)   Wt 72.3 kg (159 lb 6.3 oz)   BMI 23.20 kg/m²          Labs:     Lab Results   Component Value Date     2017    K 4.1 2017     2017    CO2 25 2017    BUN 14 2017    CREATININE 0.8 2017    ANIONGAP 9 2017     No results found for: HGBA1C  No results found for: BNP, BNPTRIAGEBLO Lab Results   Component Value Date    WBC 11.51 2017    HGB 13.5 (L) 2017    HCT 38.1 (L) 2017     2017    GRAN 8.8 (H) 2017    GRAN 76.0 (H) 2017     No results found for: CHOL, HDL, LDLCALC, TRIG       EF   Date Value Ref Range Status   2018 55 55 - 65        EK2017: SB 51 BPM, ST/TW changes    Assessment and Plan  Gal Waller is a 79 y/o male who has a PMH of PAD s/p right MARGARITO and EIA stenting 2017, CAD s/p NAZ to dLM and pLAD (oRCA  unable to be crossed with 99% stenosis), and anemia s/p CScope (reportedly with 6 polyps with removal with f/u in three years for another CScope planned) who presents as a new patient.    CAD  - c/w ASA, statin, carvedilol, Plavix  - encourage adherence to Coreg  - no angina presently    PAD  - c/w aforementioned meds  - no claudication currently    HTN  - c/w amlodipine and Coreg  - hypertensive but nonadherent  - encourage adherence    HLD  - increase statin dose to full potency    Tobacco abuse  - patient interested in OTC nicotine gum    Signed:    Luis Manuel Quijano  "

## 2018-07-16 NOTE — TELEPHONE ENCOUNTER
----- Message from Araseli Stinson sent at 7/16/2018 10:09 AM CDT -----  Contact: [facility]  Pt insurance will not cover Lipitor 40 mg, but they will cover Lipitor 80 mg so they are requesting a Rx change.   LOV 6/7/18 Dr. Quijano followed by Dr. Baker    Thanks

## 2018-07-17 ENCOUNTER — TELEPHONE (OUTPATIENT)
Dept: CARDIOLOGY | Facility: CLINIC | Age: 78
End: 2018-07-17

## 2018-07-17 RX ORDER — ATORVASTATIN CALCIUM 80 MG/1
80 TABLET, FILM COATED ORAL DAILY
Qty: 90 TABLET | Refills: 3 | Status: SHIPPED | OUTPATIENT
Start: 2018-07-17 | End: 2019-07-08 | Stop reason: SDUPTHER

## 2018-07-17 NOTE — TELEPHONE ENCOUNTER
Pt's wife notified that the Rx was called in and should be ready for  in 1 hour according to the pharmacist.She reports understanding of these instructions.

## 2018-07-17 NOTE — TELEPHONE ENCOUNTER
----- Message from Araseli Stinson sent at 7/17/2018  8:25 AM CDT -----  Contact: Selam pt wife  Pt insurance won't pay for Atorvastatin 40 mg, he needs a new Rx for 80 mg. [facility] the [facility].  LOV 6/7/18 Dr. Quijano.    Thanks

## 2018-10-10 RX ORDER — AMLODIPINE BESYLATE 10 MG/1
10 TABLET ORAL DAILY
Qty: 90 TABLET | Refills: 1 | Status: SHIPPED | OUTPATIENT
Start: 2018-10-10 | End: 2019-04-10 | Stop reason: SDUPTHER

## 2018-10-20 RX ORDER — CARVEDILOL 6.25 MG/1
TABLET ORAL
Qty: 180 TABLET | Refills: 0 | Status: SHIPPED | OUTPATIENT
Start: 2018-10-20 | End: 2019-02-01 | Stop reason: SDUPTHER

## 2018-12-31 ENCOUNTER — OFFICE VISIT (OUTPATIENT)
Dept: CARDIOLOGY | Facility: CLINIC | Age: 78
End: 2018-12-31
Payer: MEDICARE

## 2018-12-31 VITALS
OXYGEN SATURATION: 99 % | DIASTOLIC BLOOD PRESSURE: 62 MMHG | HEIGHT: 69 IN | WEIGHT: 166.69 LBS | HEART RATE: 65 BPM | BODY MASS INDEX: 24.69 KG/M2 | SYSTOLIC BLOOD PRESSURE: 138 MMHG

## 2018-12-31 DIAGNOSIS — I25.10 CORONARY ARTERY DISEASE, ANGINA PRESENCE UNSPECIFIED, UNSPECIFIED VESSEL OR LESION TYPE, UNSPECIFIED WHETHER NATIVE OR TRANSPLANTED HEART: Primary | ICD-10-CM

## 2018-12-31 PROCEDURE — 99999 PR PBB SHADOW E&M-EST. PATIENT-LVL IV: CPT | Mod: PBBFAC,GC,, | Performed by: INTERNAL MEDICINE

## 2018-12-31 PROCEDURE — 99213 OFFICE O/P EST LOW 20 MIN: CPT | Mod: GC,S$GLB,, | Performed by: INTERNAL MEDICINE

## 2018-12-31 NOTE — PROGRESS NOTES
Cardiology Clinic Note  Reason for Visit: HTN    HPI:   77 y/o male with PMH of PAD s/p right MARGARITO and EIA stenting 11/2017, CAD s/p NAZ to dLM and pLAD (oRCA  unable to be crossed with 99% stenosis), and anemia who we last saw 6/2018. The patient presents for f/u. He was non-adherent to his BP regimen, so adherence was stressed. The patient's statin was increased and he showed interest in OTC nicotine gum. The patient follows with Dr. Small who ordered an MPI in 4/2018 to f/u prior PCI. Dr. Small said unlikely to re-attempt intervention of the RCA. LDL 40 at OSH.     TTE 4/2018   1 - Normal left ventricular systolic function (EF 55-60%).   2 - Normal left ventricular diastolic function.   3 - Normal right ventricular systolic function .   4 - Mild mitral regurgitation.   5 - Mild tricuspid regurgitation.     MPI 4/2018  Impression: ABNORMAL MYOCARDIAL PERFUSION  1. There is evidence for a small amount of mild myocardial ischemia in the basilar inferoseptal wall of the left ventricle.   2. The perfusion scan is free of evidence for myocardial injury.   3. Resting wall motion is physiologic.   4. Resting LV function is normal.  (normal is >= 51%)  5. The ventricular volumes are normal at rest and stress.   6. The extracardiac distribution of radioactivity is normal.   7. There was no previous study available to compare.    Review of Systems   Constitution: Negative for chills and fever.   HENT: Negative for ear discharge.    Eyes: Negative for pain and visual disturbance.   Cardiovascular: Negative for chest pain, dyspnea on exertion, irregular heartbeat, leg swelling, orthopnea, palpitations, paroxysmal nocturnal dyspnea and syncope.   Respiratory: Negative for hemoptysis, shortness of breath and wheezing.    Skin: Negative for rash and suspicious lesions.   Musculoskeletal: Negative for joint pain and muscle weakness.   Gastrointestinal: Negative for abdominal pain, diarrhea, nausea and vomiting.    Genitourinary: Negative for dysuria and frequency.   Neurological: Negative for focal weakness, headaches and tremors.       PMH:   History reviewed. No pertinent past medical history.  Past Surgical History:   Procedure Laterality Date    ANGIOGRAM-EXTREMITY-LOWER N/A 10/23/2017    Performed by [facility] at [facility] CATH LAB    PTA-PERIPHERAL N/A 2017    [facility] at [facility] CATH LAB    PTA-PERIPHERAL N/A 10/23/2017    [facility]formed by [facility] at [facility] CATH LAB    Stent NAZ coronary N/A 2017    Performed by [facility] at [facility] CATH LAB     Allergies:     Review of patient's allergies indicates:   Allergen Reactions    Pcn [penicillins] Swelling     Medications:     Current Outpatient Medications on File Prior to Visit   Medication Sig Dispense Refill    amLODIPine (NORVASC) 10 MG tablet Take 1 tablet (10 mg total) by mouth once daily. 90 tablet 1    ascorbic acid, vitamin C, (VITAMIN C) 100 MG tablet Take 100 mg by mouth once daily.      aspirin (ECOTRIN) 81 MG EC tablet Take 81 mg by mouth 2 (two) times daily.      atorvastatin (LIPITOR) 80 MG tablet Take 1 tablet (80 mg total) by mouth once daily. 90 tablet 3    carvedilol (COREG) 6.25 MG tablet TAKE 1 TABLET BY MOUTH TWICE DAILY WITH MEALS 180 tablet 0    clopidogrel (PLAVIX) 75 mg tablet Take 1 tablet (75 mg total) by mouth once daily. 30 tablet 12    fluticasone (FLONASE) 50 mcg/actuation nasal spray       multivitamin capsule Take 1 capsule by mouth once daily.       No current facility-administered medications on file prior to visit.      Social History:     Social History     Tobacco Use    Smoking status: Former Smoker     Last attempt to quit: 10/9/2017     Years since quittin.2    Smokeless tobacco: Never Used   Substance Use Topics    Alcohol use: No     Family History:   History reviewed. No pertinent family history.    Physical Exam   Constitutional: He is oriented to  "person, place, and time. He appears well-developed and well-nourished.   HENT:   Head: Normocephalic and atraumatic.   Eyes: EOM are normal.   Cardiovascular: Normal rate and regular rhythm. Exam reveals no gallop and no friction rub.   Pulmonary/Chest: Effort normal and breath sounds normal. No stridor. He has no wheezes. He has no rales.   Abdominal: Soft. Bowel sounds are normal. There is no rebound and no guarding.   Musculoskeletal: He exhibits no edema.   Neurological: He is alert and oriented to person, place, and time. No cranial nerve deficit.   Skin: Skin is warm and dry.   Psychiatric: He has a normal mood and affect. His behavior is normal.     /62 (BP Location: Left arm, Patient Position: Sitting, BP Method: Medium (Manual))   Pulse 65   Ht 5' 9" (1.753 m)   Wt 75.6 kg (166 lb 10.7 oz)   SpO2 99%   BMI 24.61 kg/m²          Labs:     Lab Results   Component Value Date     11/21/2017    K 4.1 11/21/2017     11/21/2017    CO2 25 11/21/2017    BUN 14 11/21/2017    CREATININE 0.8 11/21/2017    ANIONGAP 9 11/21/2017     No results found for: HGBA1C  No results found for: BNP, BNPTRIAGEBLO Lab Results   Component Value Date    WBC 11.51 11/21/2017    HGB 13.5 (L) 11/21/2017    HCT 38.1 (L) 11/21/2017     11/21/2017    GRAN 8.8 (H) 11/21/2017    GRAN 76.0 (H) 11/21/2017     No results found for: CHOL, HDL, LDLCALC, TRIG       No results found for: EF    Assessment and Plan  Gal Waller is a 79 y/o male with PMH of PAD s/p right MARGARITO and EIA stenting 11/2017, CAD s/p NAZ to dLM and pLAD (oRCA  unable to be crossed with 99% stenosis), and anemia s/p CScope (reportedly with 6 polyps with removal with f/u in three years for another CScope planned) who was saw 6/2018.    CAD  - c/w ASA, statin, carvedilol, and Plavix  - no angina presently     PAD  - c/w aforementioned meds     HTN  - c/w amlodipine and Coreg  - encouraged exercise     HLD  - c/w full potency statin "     Signed:    Luis Manuel Quijano

## 2019-02-01 RX ORDER — CARVEDILOL 6.25 MG/1
6.25 TABLET ORAL 2 TIMES DAILY WITH MEALS
Qty: 180 TABLET | Refills: 3 | Status: SHIPPED | OUTPATIENT
Start: 2019-02-01 | End: 2020-02-04 | Stop reason: SDUPTHER

## 2019-02-01 NOTE — TELEPHONE ENCOUNTER
----- Message from Tamika Abdi sent at 2/1/2019  8:32 AM CST -----  Contact: pt wife Selam  Pt need a refill on medication carvedilol (COREG) 6.25 MG tablet and send to [facility] [address], [place] - [address] AT [facility] [address] [phone] (Phone)[phone] (Fax).Pt is completely out of medication.Last visit 12/31/18 Dr. Quijano. Please call pt wife Selam @ [phone] once the medication has been sent to the pharmacy. Thank  you.

## 2019-04-10 RX ORDER — AMLODIPINE BESYLATE 10 MG/1
10 TABLET ORAL DAILY
Qty: 90 TABLET | Refills: 3 | Status: SHIPPED | OUTPATIENT
Start: 2019-04-10 | End: 2020-04-07 | Stop reason: SDUPTHER

## 2019-04-10 NOTE — TELEPHONE ENCOUNTER
----- Message from Araseli Stinson sent at 4/10/2019  9:19 AM CDT -----  Contact: pt  Pt need a refill on his Norvasc 10 mg and he is completely out. Please send to [facility].  LOV 12/31/18 Dr. [facility] [address] [place] [address] [address] AT [facility] [address] [phone] (Phone)  [phone] (Fax)    Thanks

## 2019-05-26 DIAGNOSIS — I25.10 CORONARY ARTERY DISEASE, ANGINA PRESENCE UNSPECIFIED, UNSPECIFIED VESSEL OR LESION TYPE, UNSPECIFIED WHETHER NATIVE OR TRANSPLANTED HEART: ICD-10-CM

## 2019-05-26 RX ORDER — CLOPIDOGREL BISULFATE 75 MG/1
TABLET ORAL
Qty: 30 TABLET | Refills: 0 | Status: SHIPPED | OUTPATIENT
Start: 2019-05-26 | End: 2019-06-25 | Stop reason: SDUPTHER

## 2019-05-27 NOTE — PROCEDURES
"    Post Cath Note  Referring Physician: John Small MD  Procedure: Stent NAZ coronary (N/A), PTA-PERIPHERAL (N/A)       Access: Left CFA      See full report for further details    Intervention:   Unable to cross RCA proximal . LM/LAD PCI with NAZ performed without any issues. prox LAD 3.5 x 22 and LM with 4 x 12 mm NAZ. Right EIA PTAS done with 10 x 60 SES with post dilation with 10 x 20 mm baloon.    Closure device: Perclosure    Post Cath Exam:   /75   Pulse (!) 52   Temp 98 °F (36.7 °C)   Resp 15   Ht 5' 9" (1.753 m)   Wt 77.1 kg (170 lb)   SpO2 96%   BMI 25.10 kg/m²   No unusual pain, hematoma, thrill or bruit at vascular access site.  Distal pulse present without signs of ischemia.    Recommendations:   - Routine post-cath care  - IVF at 3 cc/kg/min for 4 hrs  - Cardiac rehab referral, Smoking cessation counseling, Continue medical management, Risk factor reduction  - Switching to Ticagrelor 90 mg po BID for atleast 6 months in addition to life long ASA 81 mg po daily.  - Continue with lipitor 40 mg po daily.      Signed:  Jose álvarez MD  Cardiology Fellow, PGY-7  Pager: [phone]  11/20/2017 3:25 PM  " tpe patient is a 49 y/o female now s/p drainage of intraabdominal abscess via laparoscopy . Source of infection still undetermined.No complaints.she is feeling much better   Pain is controlled.  No n/v.. Tolerating diet    Vital Signs (Most Recent):  Temp: 98.5 °F (36.9 °C) (05/27/19 0749)  Pulse: 98 (05/27/19 0749)  Resp: 18 (05/27/19 0749)  BP: 130/80 (05/27/19 0749)  SpO2: 95 % (05/27/19 0850)    Vital Signs Range (Last 24H):  Temp:  [97.9 °F (36.6 °C)-99.1 °F (37.3 °C)]   Pulse:  []   Resp:  [18-20]   BP: (125-156)/(62-80)   SpO2:  [90 %-100 %]     Gen - NAD  Abd - soft, appropriately tender, non-distended  Incision - clean and dry  Drain with copious amount of pus     Recent Labs   Lab 05/27/19  0336   WBC 14.45*   HGB 9.6*   HCT 33.2*   *         A/P POD#1   Cultures are all still pending   Clinically improved /continue present treatment

## 2019-06-25 DIAGNOSIS — I25.10 CORONARY ARTERY DISEASE, ANGINA PRESENCE UNSPECIFIED, UNSPECIFIED VESSEL OR LESION TYPE, UNSPECIFIED WHETHER NATIVE OR TRANSPLANTED HEART: ICD-10-CM

## 2019-06-26 RX ORDER — CLOPIDOGREL BISULFATE 75 MG/1
TABLET ORAL
Qty: 30 TABLET | Refills: 0 | Status: SHIPPED | OUTPATIENT
Start: 2019-06-26 | End: 2019-07-26 | Stop reason: SDUPTHER

## 2019-07-08 ENCOUNTER — OFFICE VISIT (OUTPATIENT)
Dept: CARDIOLOGY | Facility: CLINIC | Age: 79
End: 2019-07-08
Payer: MEDICARE

## 2019-07-08 VITALS
HEIGHT: 69 IN | DIASTOLIC BLOOD PRESSURE: 64 MMHG | SYSTOLIC BLOOD PRESSURE: 138 MMHG | WEIGHT: 164.25 LBS | HEART RATE: 62 BPM | BODY MASS INDEX: 24.33 KG/M2

## 2019-07-08 DIAGNOSIS — R09.89 CAROTID BRUIT, UNSPECIFIED LATERALITY: Primary | ICD-10-CM

## 2019-07-08 PROCEDURE — 99213 PR OFFICE/OUTPT VISIT, EST, LEVL III, 20-29 MIN: ICD-10-PCS | Mod: GC,S$GLB,, | Performed by: INTERNAL MEDICINE

## 2019-07-08 PROCEDURE — 99999 PR PBB SHADOW E&M-EST. PATIENT-LVL IV: CPT | Mod: PBBFAC,GC,, | Performed by: INTERNAL MEDICINE

## 2019-07-08 PROCEDURE — 99213 OFFICE O/P EST LOW 20 MIN: CPT | Mod: GC,S$GLB,, | Performed by: INTERNAL MEDICINE

## 2019-07-08 PROCEDURE — 99999 PR PBB SHADOW E&M-EST. PATIENT-LVL IV: ICD-10-PCS | Mod: PBBFAC,GC,, | Performed by: INTERNAL MEDICINE

## 2019-07-08 RX ORDER — ATORVASTATIN CALCIUM 80 MG/1
80 TABLET, FILM COATED ORAL DAILY
Qty: 90 TABLET | Refills: 3 | Status: SHIPPED | OUTPATIENT
Start: 2019-07-08 | End: 2020-04-07 | Stop reason: SDUPTHER

## 2019-07-08 NOTE — PROGRESS NOTES
I have personally taken the history and examined this patient and agree with the Fellow's note as stated above.    In fact, he is a current smoker - seemingly uninterested in quitting.  I have explained the importance of tobacco cessation and the consequences if the patient continues to smoke.  I have given a set of steps that the patient needs to follow.

## 2019-07-08 NOTE — PROGRESS NOTES
Cardiology Clinic Note  Reason for Visit: Stable CAD    HPI:   80 y/o male with PMH of PAD s/p right MARGARITO and EIA stenting 11/2017, CAD s/p NAZ to dLM and pLAD (oRCA  unable to be crossed with 99% stenosis), and anemia who presents for f/u. The patient performs lawn work twice weekly. He runs a strong ariel and a pushing  w/o CP and SOB. He also denies claudication.     Review of Systems   Constitution: Negative for chills and fever.   HENT: Negative for ear discharge.    Eyes: Negative for pain and visual disturbance.   Cardiovascular: Negative for chest pain, dyspnea on exertion, irregular heartbeat, leg swelling, orthopnea, palpitations, paroxysmal nocturnal dyspnea and syncope.   Respiratory: Negative for hemoptysis, shortness of breath and wheezing.    Skin: Negative for rash and suspicious lesions.   Musculoskeletal: Negative for joint pain and muscle weakness.   Gastrointestinal: Negative for abdominal pain, diarrhea, nausea and vomiting.   Genitourinary: Negative for dysuria and frequency.   Neurological: Negative for focal weakness, headaches and tremors.       PMH:   History reviewed. No pertinent past medical history.  Past Surgical History:   Procedure Laterality Date    ANGIOGRAM-EXTREMITY-LOWER N/A 10/23/2017    [facility] CATH LAB    PTA-PERIPHERAL N/A 11/20/2017    Per[facility] by [facility] at [facility] CATH LAB    PTA-PERIPHERAL N/A 10/23/2017    Per[facility] by [facility] at [facility] CATH LAB    Stent NAZ coronary N/A 11/20/2017    Performed by [facility] at [facility] CATH LAB     Allergies:     Review of patient's allergies indicates:   Allergen Reactions    Pcn [penicillins] Swelling     Medications:     Current Outpatient Medications on File Prior to Visit   Medication Sig Dispense Refill    amLODIPine (NORVASC) 10 MG tablet Take 1 tablet (10 mg total) by mouth once daily. 90 tablet 3    ascorbic acid, vitamin C, (VITAMIN C)  "100 MG tablet Take 100 mg by mouth once daily.      aspirin (ECOTRIN) 81 MG EC tablet Take 81 mg by mouth 2 (two) times daily.      atorvastatin (LIPITOR) 80 MG tablet Take 1 tablet (80 mg total) by mouth once daily. 90 tablet 3    carvedilol (COREG) 6.25 MG tablet Take 1 tablet (6.25 mg total) by mouth 2 (two) times daily with meals. 180 tablet 3    clopidogrel (PLAVIX) 75 mg tablet TAKE 1 TABLET(75 MG) BY MOUTH EVERY DAY 30 tablet 0    fluticasone (FLONASE) 50 mcg/actuation nasal spray       multivitamin capsule Take 1 capsule by mouth once daily.       No current facility-administered medications on file prior to visit.      Social History:     Social History     Tobacco Use    Smoking status: Former Smoker     Last attempt to quit: 10/9/2017     Years since quittin.7    Smokeless tobacco: Never Used   Substance Use Topics    Alcohol use: No     Family History:   History reviewed. No pertinent family history.    Physical Exam   Constitutional: He is oriented to person, place, and time. He appears well-developed and well-nourished.   HENT:   Head: Normocephalic and atraumatic.   Eyes: EOM are normal.   Cardiovascular: Normal rate and regular rhythm. Exam reveals no gallop and no friction rub.   Pulses:       Carotid pulses are on the left side with bruit.  Pulmonary/Chest: Effort normal and breath sounds normal. No stridor. He has no wheezes. He has no rales.   Abdominal: Soft. Bowel sounds are normal. There is no rebound and no guarding.   Musculoskeletal: He exhibits no edema.   Neurological: He is alert and oriented to person, place, and time. No cranial nerve deficit.   Skin: Skin is warm and dry.   Psychiatric: He has a normal mood and affect. His behavior is normal.     /64 (BP Location: Left arm, Patient Position: Sitting, BP Method: Medium (Automatic))   Pulse 62   Ht 5' 9" (1.753 m)   Wt 74.5 kg (164 lb 3.9 oz)   BMI 24.25 kg/m²          Labs:     Lab Results   Component Value Date "     11/21/2017    K 4.1 11/21/2017     11/21/2017    CO2 25 11/21/2017    BUN 14 11/21/2017    CREATININE 0.8 11/21/2017    ANIONGAP 9 11/21/2017     No results found for: HGBA1C  No results found for: BNP, BNPTRIAGEBLO Lab Results   Component Value Date    WBC 11.51 11/21/2017    HGB 13.5 (L) 11/21/2017    HCT 38.1 (L) 11/21/2017     11/21/2017    GRAN 8.8 (H) 11/21/2017    GRAN 76.0 (H) 11/21/2017     No results found for: CHOL, HDL, LDLCALC, TRIG       No results found for: EF    Assessment and Plan  Gal Waller is a 79 y.o. male with PMH of PAD s/p right MARGARITO and EIA stenting 11/2017, CAD s/p NAZ to dLM and pLAD (oRCA  unable to be crossed with 99% stenosis), and anemia who presents for f/u.     CAD  - c/w ASA, statin, carvedilol, and Plavix  - no angina presently     PAD  - c/w aforementioned meds  - carotid US in the setting of left carotid bruit     HTN  - c/w amlodipine and Coreg     HLD  - c/w full potency statin     Signed:    Luis Manuel Quijano

## 2019-07-11 ENCOUNTER — CLINICAL SUPPORT (OUTPATIENT)
Dept: CARDIOLOGY | Facility: CLINIC | Age: 79
End: 2019-07-11
Attending: INTERNAL MEDICINE
Payer: MEDICARE

## 2019-07-11 DIAGNOSIS — R09.89 BRUIT: ICD-10-CM

## 2019-07-11 DIAGNOSIS — R09.89 CAROTID BRUIT, UNSPECIFIED LATERALITY: ICD-10-CM

## 2019-07-11 PROCEDURE — 93880 EXTRACRANIAL BILAT STUDY: CPT | Mod: S$GLB,,, | Performed by: INTERNAL MEDICINE

## 2019-07-11 PROCEDURE — 93880 CV US DOPPLER CAROTID (CUPID ONLY): ICD-10-PCS | Mod: S$GLB,,, | Performed by: INTERNAL MEDICINE

## 2019-07-12 LAB
LEFT ARM DIASTOLIC BLOOD PRESSURE: 64 MMHG
LEFT ARM SYSTOLIC BLOOD PRESSURE: 138 MMHG
LEFT CBA DIAS: 18 CM/S
LEFT CBA SYS: 78 CM/S
LEFT CCA DIST DIAS: 17 CM/S
LEFT CCA DIST SYS: 69 CM/S
LEFT CCA MID DIAS: 15 CM/S
LEFT CCA MID SYS: 57 CM/S
LEFT CCA PROX DIAS: 21 CM/S
LEFT CCA PROX SYS: 73 CM/S
LEFT ECA DIAS: 17 CM/S
LEFT ECA SYS: 134 CM/S
LEFT ICA DIST DIAS: 25 CM/S
LEFT ICA DIST SYS: 82 CM/S
LEFT ICA MID DIAS: 94 CM/S
LEFT ICA MID SYS: 242 CM/S
LEFT ICA PROX DIAS: 280 CM/S
LEFT ICA PROX SYS: 688 CM/S
LEFT VERTEBRAL DIAS: 25 CM/S
LEFT VERTEBRAL SYS: 69 CM/S
OHS CV CAROTID RIGHT ICA EDV HIGHEST: 0
OHS CV CAROTID ULTRASOUND LEFT ICA/CCA RATIO: 9.42
OHS CV CAROTID ULTRASOUND RIGHT ICA/CCA RATIO: 0
OHS CV PV CAROTID LEFT HIGHEST CCA: 73
OHS CV PV CAROTID LEFT HIGHEST ICA: 688
OHS CV PV CAROTID RIGHT HIGHEST CCA: 70
OHS CV PV CAROTID RIGHT HIGHEST ICA: 0
OHS CV US CAROTID LEFT HIGHEST EDV: 280
RIGHT ARM DIASTOLIC BLOOD PRESSURE: 59 MMHG
RIGHT ARM SYSTOLIC BLOOD PRESSURE: 128 MMHG
RIGHT CBA DIAS: 12 CM/S
RIGHT CBA SYS: 62 CM/S
RIGHT CCA DIST DIAS: 8 CM/S
RIGHT CCA DIST SYS: 47 CM/S
RIGHT CCA MID DIAS: 7 CM/S
RIGHT CCA MID SYS: 53 CM/S
RIGHT CCA PROX DIAS: 9 CM/S
RIGHT CCA PROX SYS: 70 CM/S
RIGHT ECA DIAS: 17 CM/S
RIGHT ECA SYS: 143 CM/S
RIGHT ICA DIST DIAS: 0 CM/S
RIGHT ICA DIST SYS: 0 CM/S
RIGHT ICA MID DIAS: 0 CM/S
RIGHT ICA MID SYS: 0 CM/S
RIGHT ICA PROX DIAS: 0 CM/S
RIGHT ICA PROX SYS: 0 CM/S
RIGHT VERTEBRAL DIAS: 18 CM/S
RIGHT VERTEBRAL SYS: 56 CM/S

## 2019-07-15 ENCOUNTER — HOSPITAL ENCOUNTER (OUTPATIENT)
Dept: RADIOLOGY | Facility: HOSPITAL | Age: 79
Discharge: HOME | End: 2019-07-15
Attending: INTERNAL MEDICINE
Payer: MEDICARE

## 2019-07-15 DIAGNOSIS — R09.89 BRUIT: ICD-10-CM

## 2019-07-15 LAB
CREAT SERPL-MCNC: 1.2 MG/DL (ref 0.5–1.4)
SAMPLE: NORMAL

## 2019-07-15 PROCEDURE — 70498 CT ANGIOGRAPHY NECK: CPT | Mod: 26,,, | Performed by: RADIOLOGY

## 2019-07-15 PROCEDURE — 70498 CTA NECK: ICD-10-PCS | Mod: 26,,, | Performed by: RADIOLOGY

## 2019-07-15 PROCEDURE — 70498 CT ANGIOGRAPHY NECK: CPT | Mod: TC

## 2019-07-15 PROCEDURE — 25500020 PHARM REV CODE 255: Performed by: INTERNAL MEDICINE

## 2019-07-15 RX ADMIN — IOHEXOL 75 ML: 350 INJECTION, SOLUTION INTRAVENOUS at 09:07

## 2019-07-24 ENCOUNTER — OFFICE VISIT (OUTPATIENT)
Dept: CARDIOLOGY | Facility: CLINIC | Age: 79
End: 2019-07-24
Payer: MEDICARE

## 2019-07-24 VITALS
DIASTOLIC BLOOD PRESSURE: 66 MMHG | HEART RATE: 63 BPM | WEIGHT: 164.44 LBS | SYSTOLIC BLOOD PRESSURE: 134 MMHG | BODY MASS INDEX: 24.36 KG/M2 | OXYGEN SATURATION: 98 % | HEIGHT: 69 IN

## 2019-07-24 DIAGNOSIS — I25.10 CORONARY ARTERY DISEASE INVOLVING NATIVE CORONARY ARTERY OF NATIVE HEART WITHOUT ANGINA PECTORIS: ICD-10-CM

## 2019-07-24 DIAGNOSIS — I65.23 CAROTID STENOSIS, ASYMPTOMATIC, BILATERAL: Primary | ICD-10-CM

## 2019-07-24 DIAGNOSIS — I73.9 PAD (PERIPHERAL ARTERY DISEASE): ICD-10-CM

## 2019-07-24 PROCEDURE — 99215 OFFICE O/P EST HI 40 MIN: CPT | Mod: S$GLB,,, | Performed by: INTERNAL MEDICINE

## 2019-07-24 PROCEDURE — 3075F SYST BP GE 130 - 139MM HG: CPT | Mod: CPTII,S$GLB,, | Performed by: INTERNAL MEDICINE

## 2019-07-24 PROCEDURE — 99999 PR PBB SHADOW E&M-EST. PATIENT-LVL IV: ICD-10-PCS | Mod: PBBFAC,,, | Performed by: INTERNAL MEDICINE

## 2019-07-24 PROCEDURE — 3075F PR MOST RECENT SYSTOLIC BLOOD PRESS GE 130-139MM HG: ICD-10-PCS | Mod: CPTII,S$GLB,, | Performed by: INTERNAL MEDICINE

## 2019-07-24 PROCEDURE — 3078F DIAST BP <80 MM HG: CPT | Mod: CPTII,S$GLB,, | Performed by: INTERNAL MEDICINE

## 2019-07-24 PROCEDURE — 1101F PR PT FALLS ASSESS DOC 0-1 FALLS W/OUT INJ PAST YR: ICD-10-PCS | Mod: CPTII,S$GLB,, | Performed by: INTERNAL MEDICINE

## 2019-07-24 PROCEDURE — 3078F PR MOST RECENT DIASTOLIC BLOOD PRESSURE < 80 MM HG: ICD-10-PCS | Mod: CPTII,S$GLB,, | Performed by: INTERNAL MEDICINE

## 2019-07-24 PROCEDURE — 1101F PT FALLS ASSESS-DOCD LE1/YR: CPT | Mod: CPTII,S$GLB,, | Performed by: INTERNAL MEDICINE

## 2019-07-24 PROCEDURE — 99999 PR PBB SHADOW E&M-EST. PATIENT-LVL IV: CPT | Mod: PBBFAC,,, | Performed by: INTERNAL MEDICINE

## 2019-07-24 PROCEDURE — 99215 PR OFFICE/OUTPT VISIT, EST, LEVL V, 40-54 MIN: ICD-10-PCS | Mod: S$GLB,,, | Performed by: INTERNAL MEDICINE

## 2019-07-24 NOTE — ASSESSMENT & PLAN NOTE
- Patient with multivessel CAD including distal LM/prox LAD/LCx and RCA , s/p PCI to LM/LAD in 11/2017 (RCA  unable to be crossed)  - Denies any recent angina  - Continue ASA, clopidogrel, and atorvastatin as above

## 2019-07-24 NOTE — ASSESSMENT & PLAN NOTE
- Hx of R MARGARITO and EIA stenting  - Denies claudication at this time  - Continue ASA, clopidogrel, and atorvastatin as above  - Currently in the [facility] smoking cessation program, now down to 0.5 ppd

## 2019-07-24 NOTE — PROGRESS NOTES
Interventional Cardiology Clinic Note  Reason for Visit: Carotid Artery Stenosis  Referring Physician: Dr. Glynn Quijano/Dr. Luigi Gunn    HPI:   Gal Waller is a 79 y.o. male who presents for Carotid Artery Disease. He has a PMHx of PAD s/p R MARGARITO and EIA stenting in 11/2017, CAD s/p NAZ to dLM and pLAD (oRCA  unable to be crossed with 99% stenosis), and anemia. He underwent a carotid U/S after a carotid bruit was heard in general cardiology clinic, and it revealed a complete occlusion of the R ICA and 80-99% stenosis of the L ICA. A subsequent CTA neck confirmed a 90% L ICA stenosis with complete R ICA occlusion.    Patient otherwise denies any Hx of CVA/TIA, no visual changes or Hx of focal weakness, no syncope. He also denies any chest pain, SOB, claudication, PND, orthopnea, or LE edema.    He remains active and mows his lawn regularly without limitation.  Attempting smoking cessation with his wife through the [facility] Smoking Cessation program, currently down to 0.5 ppd.    ROS:    Constitution: Negative for fever, chills, weight loss or gain.   HENT: Negative for sore throat, rhinorrhea, or headache.  Eyes: Negative for blurred or double vision.   Cardiovascular: See above  Pulmonary: Negative for SOB   Gastrointestinal: Negative for abdominal pain, nausea, vomiting, or diarrhea.   : Negative for dysuria.   Neurological: Negative for focal weakness or sensory changes.  PMH:   History reviewed. No pertinent past medical history.  Past Surgical History:   Procedure Laterality Date    ANGIOGRAM-EXTREMITY-LOWER N/A 10/23/2017    Performed by [facility] at [facility] CATH LAB    PTA-PERIPHERAL N/A 11/20/2017    Performed by [facility] at [facility] CATH LAB    PTA-PERIPHERAL N/A 10/23/2017    Per[facility] by [facility] at [facility] CATH LAB    Stent NAZ coronary N/A 11/20/2017    Performed by [facility] MD at [facility] CATH LAB     Allergies:     Review of patient's allergies  "indicates:   Allergen Reactions    Pcn [penicillins] Swelling     Medications:     Current Outpatient Medications on File Prior to Visit   Medication Sig Dispense Refill    amLODIPine (NORVASC) 10 MG tablet Take 1 tablet (10 mg total) by mouth once daily. 90 tablet 3    ascorbic acid, vitamin C, (VITAMIN C) 100 MG tablet Take 100 mg by mouth once daily.      aspirin (ECOTRIN) 81 MG EC tablet Take 81 mg by mouth 2 (two) times daily.      atorvastatin (LIPITOR) 80 MG tablet Take 1 tablet (80 mg total) by mouth once daily. 90 tablet 3    carvedilol (COREG) 6.25 MG tablet Take 1 tablet (6.25 mg total) by mouth 2 (two) times daily with meals. 180 tablet 3    clopidogrel (PLAVIX) 75 mg tablet TAKE 1 TABLET(75 MG) BY MOUTH EVERY DAY 30 tablet 0    fluticasone (FLONASE) 50 mcg/actuation nasal spray       multivitamin capsule Take 1 capsule by mouth once daily.       No current facility-administered medications on file prior to visit.      Social History:     Social History     Tobacco Use    Smoking status: Former Smoker     Last attempt to quit: 10/9/2017     Years since quittin.7    Smokeless tobacco: Never Used   Substance Use Topics    Alcohol use: No     Family History:   History reviewed. No pertinent family history.  Physical Exam:   /66 (BP Location: Left arm, Patient Position: Sitting, BP Method: Large (Automatic))   Pulse 63   Ht 5' 9" (1.753 m)   Wt 74.6 kg (164 lb 7.4 oz)   SpO2 98%   BMI 24.29 kg/m²      Constitutional: NAD, conversant  HEENT: Sclera anicteric, PERRLA, EOMI  Neck: No JVD, L carotid bruit  CV: RRR, no murmur, normal S1/S2  Pulm: CTAB, no wheezes, rales, or ronchi  GI: Abdomen soft, NTND, +BS  Extremities: No LE edema, warm and well perfused  Skin: No ecchymosis, erythema, or ulcers  Psych: AOx3, appropriate affect  Neuro: No gross deficits    Labs:     Lab Results   Component Value Date     07/15/2019    K 4.0 07/15/2019     07/15/2019    CO2 27 07/15/2019 "    BUN 20 07/15/2019    CREATININE 1.1 07/15/2019    ANIONGAP 9 07/15/2019     No results found for: HGBA1C  No results found for: BNP, BNPTRIAGEBLO Lab Results   Component Value Date    WBC 11.51 11/21/2017    HGB 13.5 (L) 11/21/2017    HCT 38.1 (L) 11/21/2017     11/21/2017    GRAN 8.8 (H) 11/21/2017    GRAN 76.0 (H) 11/21/2017     No results found for: CHOL, HDL, LDLCALC, TRIG       Imaging:   Carotid U/S (7/11/2019)  Occluded right internal carotid artery.  There is 80-99% left Internal Carotid Stenosis.  Diffuse calcific plaque seen bilateral distal common carotid, proximal internal external carotid arteries.  Less than 50% stenosis bilateral external carotid arteries.  Antegrade flow bilateral vertebral arteries.    CTA Neck (7/15/2019)  Complete occlusion of the right ICA at its origin and throughout its course with reconstitution in the cavernous portion and greater than 90% stenosis in the proximal left ICA with reconstitution just distally.  Partially visualized intracranial vasculature is within normal limits.    Assessment:     1. Carotid stenosis, asymptomatic, bilateral    2. Coronary artery disease involving native coronary artery of native heart without angina pectoris    3. PAD (peripheral artery disease)      Plan:     Carotid stenosis, asymptomatic, bilateral  - Patient with asymptomatic B/L carotid artery stenosis  - Complete occlusion of the R ICA per U/S and CTA, and 80-99% L ICA stenosis per U/S and 90% L ICA stenosis per CTA  - Excluded from CREST-2 due to contralateral complete occlusion  - Will refer to vascular surgery to evaluate for CEA  - Patient may be a candidate for [facility] if not a surgical candidate  - Continue ASA, clopidogrel, and atorvastatin    Coronary artery disease involving native coronary artery of native heart without angina pectoris  - Patient with multivessel CAD including distal LM/prox LAD/LCx and RCA , s/p PCI to LM/LAD  in 11/2017 (RCA  unable to be crossed)  - Denies any recent angina  - Continue ASA, clopidogrel, and atorvastatin as above    PAD (peripheral artery disease)  - Hx of R MARGARITO and EIA stenting  - Denies claudication at this time  - Continue ASA, clopidogrel, and atorvastatin as above  - Currently in the [facility] smoking cessation program, now down to 0.5 ppd    Signed:  Eleazar Dillon MD  Interventional Cardiology Fellow, PGY-7  Pager: [phone]  7/24/2019 11:30 AM

## 2019-07-24 NOTE — ASSESSMENT & PLAN NOTE
- Patient with asymptomatic B/L carotid artery stenosis  - Complete occlusion of the R ICA per U/S and CTA, and 80-99% L ICA stenosis per U/S and 90% L ICA stenosis per CTA  - Excluded from CREST-2 due to contralateral complete occlusion  - Will refer to vascular surgery to evaluate for CEA  - Patient may be a candidate for [facility]ent [facility] if not a surgical candidate  - Continue ASA, clopidogrel, and atorvastatin

## 2019-07-26 DIAGNOSIS — I25.10 CORONARY ARTERY DISEASE, ANGINA PRESENCE UNSPECIFIED, UNSPECIFIED VESSEL OR LESION TYPE, UNSPECIFIED WHETHER NATIVE OR TRANSPLANTED HEART: ICD-10-CM

## 2019-07-26 RX ORDER — CLOPIDOGREL BISULFATE 75 MG/1
TABLET ORAL
Qty: 30 TABLET | Refills: 0 | Status: ON HOLD | OUTPATIENT
Start: 2019-07-26 | End: 2019-08-25 | Stop reason: SDUPTHER

## 2019-07-30 ENCOUNTER — CLINICAL SUPPORT (OUTPATIENT)
Dept: SMOKING CESSATION | Facility: CLINIC | Age: 79
End: 2019-07-30
Payer: COMMERCIAL

## 2019-07-30 DIAGNOSIS — F17.200 NICOTINE DEPENDENCE: Primary | ICD-10-CM

## 2019-07-30 PROCEDURE — 99404 PREV MED CNSL INDIV APPRX 60: CPT | Mod: S$GLB,,,

## 2019-07-30 PROCEDURE — 99999 PR PBB SHADOW E&M-EST. PATIENT-LVL I: CPT | Mod: PBBFAC,,,

## 2019-07-30 PROCEDURE — 99404 PR PREVENT COUNSEL,INDIV,60 MIN: ICD-10-PCS | Mod: S$GLB,,,

## 2019-07-30 PROCEDURE — 99999 PR PBB SHADOW E&M-EST. PATIENT-LVL I: ICD-10-PCS | Mod: PBBFAC,,,

## 2019-07-30 RX ORDER — IBUPROFEN 200 MG
1 TABLET ORAL DAILY
Qty: 14 PATCH | Refills: 0 | Status: SHIPPED | OUTPATIENT
Start: 2019-07-30 | End: 2019-08-18

## 2019-07-30 NOTE — Clinical Note
Patient will be participating in biweekly tobacco cessation meetings and will begin the prescribed tobacco cessation medication regime of  14 mg nicotine patch QD .  He  currently smokes 10 cigarettes per day.  Pt started on rate reduction and wait time of 15 min prior to smoking. Pt's exhaled carbon monoxide level was  14 ppm as per [employer]oker[employer]. (non- smoker = 0-5 ppm.) Will see pt back in office in 2 weeks.

## 2019-08-12 ENCOUNTER — CLINICAL SUPPORT (OUTPATIENT)
Dept: SMOKING CESSATION | Facility: CLINIC | Age: 79
End: 2019-08-12
Payer: COMMERCIAL

## 2019-08-12 DIAGNOSIS — F17.200 NICOTINE DEPENDENCE: Primary | ICD-10-CM

## 2019-08-12 PROCEDURE — 99407 BEHAV CHNG SMOKING > 10 MIN: CPT | Mod: S$GLB,,,

## 2019-08-12 PROCEDURE — 99407 PR TOBACCO USE CESSATION INTENSIVE >10 MINUTES: ICD-10-PCS | Mod: S$GLB,,,

## 2019-08-13 ENCOUNTER — OFFICE VISIT (OUTPATIENT)
Dept: VASCULAR SURGERY | Facility: CLINIC | Age: 79
End: 2019-08-13
Payer: MEDICARE

## 2019-08-13 VITALS
BODY MASS INDEX: 23.84 KG/M2 | TEMPERATURE: 99 F | DIASTOLIC BLOOD PRESSURE: 70 MMHG | HEIGHT: 69 IN | SYSTOLIC BLOOD PRESSURE: 155 MMHG | HEART RATE: 57 BPM | WEIGHT: 160.94 LBS

## 2019-08-13 DIAGNOSIS — I65.23 CAROTID STENOSIS, ASYMPTOMATIC, BILATERAL: Primary | ICD-10-CM

## 2019-08-13 PROCEDURE — 3078F PR MOST RECENT DIASTOLIC BLOOD PRESSURE < 80 MM HG: ICD-10-PCS | Mod: CPTII,S$GLB,, | Performed by: SURGERY

## 2019-08-13 PROCEDURE — 1101F PR PT FALLS ASSESS DOC 0-1 FALLS W/OUT INJ PAST YR: ICD-10-PCS | Mod: CPTII,S$GLB,, | Performed by: SURGERY

## 2019-08-13 PROCEDURE — 99205 PR OFFICE/OUTPT VISIT, NEW, LEVL V, 60-74 MIN: ICD-10-PCS | Mod: S$GLB,,, | Performed by: SURGERY

## 2019-08-13 PROCEDURE — 3077F SYST BP >= 140 MM HG: CPT | Mod: CPTII,S$GLB,, | Performed by: SURGERY

## 2019-08-13 PROCEDURE — 3078F DIAST BP <80 MM HG: CPT | Mod: CPTII,S$GLB,, | Performed by: SURGERY

## 2019-08-13 PROCEDURE — 99999 PR PBB SHADOW E&M-EST. PATIENT-LVL III: ICD-10-PCS | Mod: PBBFAC,,, | Performed by: SURGERY

## 2019-08-13 PROCEDURE — 1101F PT FALLS ASSESS-DOCD LE1/YR: CPT | Mod: CPTII,S$GLB,, | Performed by: SURGERY

## 2019-08-13 PROCEDURE — 99205 OFFICE O/P NEW HI 60 MIN: CPT | Mod: S$GLB,,, | Performed by: SURGERY

## 2019-08-13 PROCEDURE — 99999 PR PBB SHADOW E&M-EST. PATIENT-LVL III: CPT | Mod: PBBFAC,,, | Performed by: SURGERY

## 2019-08-13 PROCEDURE — 3077F PR MOST RECENT SYSTOLIC BLOOD PRESSURE >= 140 MM HG: ICD-10-PCS | Mod: CPTII,S$GLB,, | Performed by: SURGERY

## 2019-08-13 NOTE — PROGRESS NOTES
"REFERRING PHYSICIAN:  John Small M.D.    HISTORY OF PRESENT ILLNESS:  A 79-year-old male sent for evaluation of carotid   disease.  He was recently found to have a right chronic ICA occlusion and   greater than 90% left carotid stenosis based on CTA and duplex imaging.  He has   no history of stroke, TIA or amaurosis.    He does have known coronary artery disease, status post PCI in 2017.  He had a   stress test in 2018 showing normal ejection fraction and a very small focus of   ischemia at risk.    He has excellent exercise tolerance, however, cuts his own grass, walk a flight   of stairs without any problems.    PAST MEDICAL HISTORY:  1.  Coronary artery disease, status post PCI as above.  2.  Hypertension.  3.  History of alcohol abuse  4.  Hypothyroidism.    PAST SURGICAL HISTORY:  Nil.    FAMILY HISTORY:  Nil.    SOCIAL HISTORY:  He is a current smoker, although he says he stopped "four days   ago."    MEDICATIONS:  Include aspirin, Plavix and statin.  See EPIC for full list.    ALLERGIES:  PENICILLIN.    REVIEW OF SYSTEMS:  Denies fever, pain or DVT.  All other systems including eyes, ENT, respiratory, musculoskeletal,   psychiatric, lymph, allergy and immune are negative.    PHYSICAL EXAMINATION:  VITAL SIGNS:  See nursing notes.  GENERAL:  He is in no acute distress.  RESPIRATORY:  Normal effort.  Clear to auscultation.  CARDIAC:  Regular rate and rhythm.  Nondisplaced PMI.  No murmur.  VASCULAR:  2+ radial pulses, although distal radial artery still very calcified.  ABDOMEN:  No mass or tenderness.  No hepatosplenomegaly.  Aorta cannot be   palpated.  EYES:  Normal conjunctivae and lids.  ENT:  Fair dentition.  NECK:  No JVD, thyromegaly.  MUSCULOSKELETAL:  No kyphosis or scoliosis.  Extremities are without clubbing or   cyanosis.  SKIN:  Warm and dry.  NEUROLOGIC:  Alert and oriented x3.  Normal mood and affect.  Midline tongue.    No speech difficulty or hoarseness and 5/5 motor strength in all " extremities.    IMAGING:  Cardiology carotid duplex reveals a right ICA occlusion and greater   than 90% left ICA stenosis with peak velocity of 688 and end-diastolic velocity   of 280.    A carotid CTA was personally reviewed and demonstrates:  1.  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  2.  Right ICA occlusion.  3.  Left greater than 95% carotid stenosis approximately 1.5 to 2 cm above the   carotid bulb surrounded by soft plaque and outer highly calcific disease.    Lesion ends at C2.    ASSESSMENT:  1.  Greater than 95% left carotid stenosis with contralateral occlusion,   neurologically asymptomatic.  2.  Known coronary artery disease with preserved ejection fraction and excellent   exercise tolerance.    Given the right ICA occlusion and the extremely narrow left ICA stenosis greater   than 95%, I do believe that intervention is warranted.    I believe that the safest formal treatment for him would be a left carotid   endarterectomy.  He is at  modestly increased cardiac risk given his known   coronary artery disease, but has excellent exercise tolerance.    He would be athigh risk for transfemoral stenting given his age and his aortic arch   atheroma.    Finally, he would be at a higher risk for TCAR given the anatomy of his long   segment stenosis and associated calcification, as well as soft atheroma.    I have discussed this in great detail with the patient and wife.    He is undecided about wishing to proceed.  He wants to think about it and return   to see me in one week's time.    RECOMMENDATION:  Continue current medical therapy.      LINDA  dd: 08/13/2019 15:52:15 (CDT)  td: 08/14/2019 07:15:00 (CDT)  Doc ID   #[id number]  Job ID #[id number]    CC:

## 2019-08-13 NOTE — LETTER
[facility]  [address]  [place] [address]  Phone: [phone]  Fax: [phone] August 13, 2019      John Small MD  [address]  [place] [address]    Patient: Gal Waller   MR Number: [id number]   YOB: 1940   Date of Visit: 8/13/2019       Dear Dr. John Small:    Thank you for referring Gal Waller to me for evaluation. Attached you will find relevant portions of my assessment and plan of care.    If you have questions, please do not hesitate to call me. I look forward to following Gal Waller along with you.    Sincerely,        ARIK Davis III, MD    WCS/etb    Enclosure

## 2019-08-14 ENCOUNTER — CLINICAL SUPPORT (OUTPATIENT)
Dept: SMOKING CESSATION | Facility: CLINIC | Age: 79
End: 2019-08-14
Payer: COMMERCIAL

## 2019-08-14 DIAGNOSIS — F17.200 NICOTINE DEPENDENCE: Primary | ICD-10-CM

## 2019-08-14 PROCEDURE — 99404 PREV MED CNSL INDIV APPRX 60: CPT | Mod: S$GLB,,,

## 2019-08-14 PROCEDURE — 99999 PR PBB SHADOW E&M-EST. PATIENT-LVL I: ICD-10-PCS | Mod: PBBFAC,,,

## 2019-08-14 PROCEDURE — 99404 PR PREVENT COUNSEL,INDIV,60 MIN: ICD-10-PCS | Mod: S$GLB,,,

## 2019-08-14 PROCEDURE — 99999 PR PBB SHADOW E&M-EST. PATIENT-LVL I: CPT | Mod: PBBFAC,,,

## 2019-08-14 NOTE — PROGRESS NOTES
Individual Follow-Up Form    8/14/2019    Quit Date: 8/9/19    Clinical Status of Patient: Outpatient    Length of Service: 60 minutes    Continuing Medication: no    Other Medications: none     Target Symptoms: Withdrawal and medication side effects. The following were  rated moderate (3) to severe (4) on TCRS:  · Moderate (3): none  · Severe (4): none    Comments: Patient seen in office today. Patient is tobacco free since 8/9/19.  Pt no longer using any tobacco cessation medications.   No adverse effects/mental changes noted at this time. Reviewed coping strategies/habitual behavior/relapse prevention with patient. Exhaled carbon monoxide level was 0 ppm per Smokerlyzer (non- smoker = 0-5 ppm .).  Patient elected to receive his quit coin. Will see pt back in office in 2 weeks.  Congratulated patient on his/her success.       Diagnosis: F17.200    Next Visit: 2 weeks

## 2019-08-14 NOTE — Clinical Note
Patient is tobacco free since 8/9/19.  Pt no longer using any tobacco cessation medications.   No adverse effects/mental changes noted at this time. Reviewed coping strategies/habitual behavior/relapse prevention with patient. Exhaled carbon monoxide level was 0 ppm per Smokerlyzer (non- smoker = 0-5 ppm .).  Patient elected to receive his quit coin. Will see pt back in office in 2 weeks.  Congratulated patient on his/her success.

## 2019-08-15 ENCOUNTER — TELEPHONE (OUTPATIENT)
Dept: VASCULAR SURGERY | Facility: CLINIC | Age: 79
End: 2019-08-15

## 2019-08-15 NOTE — TELEPHONE ENCOUNTER
Pt states he was calling to schedule surgery with Dr. Davis. Reminded patient that he is scheduled to see Dr. Davis in clinic on 8/20/19 at 0945 and that that time he and Dr. Davis can discuss a date for surgery, sign consents, receive pre-operative teaching, and make sure he has all labs/studies he needs. Pt verbalized understanding.----- Message from Jose Herrera sent at 8/15/2019 10:32 AM CDT -----  Reason for call:Pt calling to schedule surgery.        Communication Preference:[phone] or [phone]    Additional Information:

## 2019-08-20 ENCOUNTER — OFFICE VISIT (OUTPATIENT)
Dept: VASCULAR SURGERY | Facility: CLINIC | Age: 79
End: 2019-08-20
Payer: MEDICARE

## 2019-08-20 VITALS
HEIGHT: 70 IN | DIASTOLIC BLOOD PRESSURE: 69 MMHG | WEIGHT: 163.13 LBS | HEART RATE: 60 BPM | BODY MASS INDEX: 23.35 KG/M2 | TEMPERATURE: 98 F | SYSTOLIC BLOOD PRESSURE: 139 MMHG

## 2019-08-20 DIAGNOSIS — Z01.818 PRE-OP EVALUATION: ICD-10-CM

## 2019-08-20 DIAGNOSIS — I65.23 CAROTID STENOSIS, ASYMPTOMATIC, BILATERAL: Primary | ICD-10-CM

## 2019-08-20 PROCEDURE — 99215 OFFICE O/P EST HI 40 MIN: CPT | Mod: S$GLB,,, | Performed by: SURGERY

## 2019-08-20 PROCEDURE — 3075F SYST BP GE 130 - 139MM HG: CPT | Mod: CPTII,S$GLB,, | Performed by: SURGERY

## 2019-08-20 PROCEDURE — 3078F DIAST BP <80 MM HG: CPT | Mod: CPTII,S$GLB,, | Performed by: SURGERY

## 2019-08-20 PROCEDURE — 99999 PR PBB SHADOW E&M-EST. PATIENT-LVL III: ICD-10-PCS | Mod: PBBFAC,,, | Performed by: SURGERY

## 2019-08-20 PROCEDURE — 99999 PR PBB SHADOW E&M-EST. PATIENT-LVL III: CPT | Mod: PBBFAC,,, | Performed by: SURGERY

## 2019-08-20 PROCEDURE — 3075F PR MOST RECENT SYSTOLIC BLOOD PRESS GE 130-139MM HG: ICD-10-PCS | Mod: CPTII,S$GLB,, | Performed by: SURGERY

## 2019-08-20 PROCEDURE — 1101F PR PT FALLS ASSESS DOC 0-1 FALLS W/OUT INJ PAST YR: ICD-10-PCS | Mod: CPTII,S$GLB,, | Performed by: SURGERY

## 2019-08-20 PROCEDURE — 99215 PR OFFICE/OUTPT VISIT, EST, LEVL V, 40-54 MIN: ICD-10-PCS | Mod: S$GLB,,, | Performed by: SURGERY

## 2019-08-20 PROCEDURE — 3078F PR MOST RECENT DIASTOLIC BLOOD PRESSURE < 80 MM HG: ICD-10-PCS | Mod: CPTII,S$GLB,, | Performed by: SURGERY

## 2019-08-20 PROCEDURE — 1101F PT FALLS ASSESS-DOCD LE1/YR: CPT | Mod: CPTII,S$GLB,, | Performed by: SURGERY

## 2019-08-20 NOTE — PROGRESS NOTES
"REFERRING PHYSICIAN:  John Small M.D.    HISTORY OF PRESENT ILLNESS:  A 79-year-old male sent for evaluation of carotid   disease.  He was recently found to have a right chronic ICA occlusion and   greater than 90% left carotid stenosis based on CTA and duplex imaging.  He has   no history of stroke, TIA or amaurosis.    He does have known coronary artery disease, status post PCI in 2017.  He had a   stress test in 2018 showing normal ejection fraction and a very small focus of   ischemia at risk.    He has excellent exercise tolerance, however, cuts his own grass, walk a flight   of stairs without any problems.    He now returns from his initial visit and is ready to proceed with CEA.  In the last week, he had a transient (5 sec) RIGHT eye visual disturbance    PAST MEDICAL HISTORY:  1.  Coronary artery disease, status post PCI as above.  2.  Hypertension.  3.  History of alcohol abuse  4.  Hypothyroidism.    PAST SURGICAL HISTORY:  Nil.    FAMILY HISTORY:  Nil.    SOCIAL HISTORY:  He is a current smoker, although he says he stopped "four days   ago."    MEDICATIONS:  Include aspirin, Plavix and statin.  See EPIC for full list.    ALLERGIES:  PENICILLIN.    REVIEW OF SYSTEMS:  Denies fever, pain or DVT.  All other systems including eyes, ENT, respiratory, musculoskeletal,   psychiatric, lymph, allergy and immune are negative.    PHYSICAL EXAMINATION:  VITAL SIGNS:  See nursing notes.  GENERAL:  He is in no acute distress.  RESPIRATORY:  Normal effort.  Clear to auscultation.  CARDIAC:  Regular rate and rhythm.  Nondisplaced PMI.  No murmur.  VASCULAR:  2+ radial pulses, although distal radial artery still very calcified.  ABDOMEN:  No mass or tenderness.  No hepatosplenomegaly.  Aorta cannot be   palpated.  EYES:  Normal conjunctivae and lids.  ENT:  Fair dentition.  NECK:  No JVD, thyromegaly.  MUSCULOSKELETAL:  No kyphosis or scoliosis.  Extremities are without clubbing or   cyanosis.  SKIN:  Warm and " dry.  NEUROLOGIC:  Alert and oriented x3.  Normal mood and affect.  Midline tongue.    No speech difficulty or hoarseness and 5/5 motor strength in all extremities.    IMAGING:  Cardiology carotid duplex reveals a right ICA occlusion and greater   than 90% left ICA stenosis with peak velocity of 688 and end-diastolic velocity   of 280.    A carotid CTA was personally reviewed and demonstrates:  1.  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  2.  Right ICA occlusion.  3.  Left greater than 95% carotid stenosis approximately 1.5 to 2 cm above the   carotid bulb surrounded by soft plaque and outer highly calcific disease.    Lesion ends at C2.    ASSESSMENT:  1.  Greater than 95% left carotid stenosis with contralateral occlusion.  Very recent RIGHT visual event could be a watershed event     2.  Known coronary artery disease with preserved ejection fraction and excellent   exercise tolerance.    Given the right ICA occlusion and the extremely narrow left ICA stenosis greater   than 95%, I do believe that intervention is warranted.    I believe that the safest formal treatment for him would be a left carotid   endarterectomy.  He is at  modestly increased cardiac risk given his known   coronary artery disease, but has excellent exercise tolerance.    He would be athigh risk for transfemoral stenting given his age and his aortic arch   atheroma.    Finally, he would be at a higher risk for TCAR given the anatomy of his long   segment stenosis and associated calcification, as well as soft atheroma.    Plan:   L CEA 8/26/19  GOT  Cont Asa/Plavix    I have explained the risks, benefits and alternatives for this procedure in detail.  The patients voices understanding and all questions have be answered, and agrees to proceed with the procedure.    ARIK Davis III, MD, FACS  Professor and Chief, Vascular and Endovascular Surgery

## 2019-08-21 ENCOUNTER — TELEPHONE (OUTPATIENT)
Dept: VASCULAR SURGERY | Facility: CLINIC | Age: 79
End: 2019-08-21

## 2019-08-21 NOTE — TELEPHONE ENCOUNTER
Contacted patient to reschedule pre-op appts from [facility] to [facility] as EKG cannot be done at [facility]. Pt verbalized understanding and states this will be fine. Appts for pre-op EKG, CXR,and lab rescheduled, pt verified.

## 2019-08-22 ENCOUNTER — HOSPITAL ENCOUNTER (OUTPATIENT)
Dept: CARDIOLOGY | Facility: CLINIC | Age: 79
Discharge: HOME | End: 2019-08-22
Payer: MEDICARE

## 2019-08-22 ENCOUNTER — HOSPITAL ENCOUNTER (OUTPATIENT)
Dept: RADIOLOGY | Facility: HOSPITAL | Age: 79
Discharge: HOME | End: 2019-08-22
Attending: SURGERY
Payer: MEDICARE

## 2019-08-22 DIAGNOSIS — Z01.818 PRE-OP EVALUATION: ICD-10-CM

## 2019-08-22 PROCEDURE — 93005 EKG 12-LEAD: ICD-10-PCS | Mod: S$GLB,,, | Performed by: SURGERY

## 2019-08-22 PROCEDURE — 71046 X-RAY EXAM CHEST 2 VIEWS: CPT | Mod: TC

## 2019-08-22 PROCEDURE — 93010 ELECTROCARDIOGRAM REPORT: CPT | Mod: S$GLB,,, | Performed by: INTERNAL MEDICINE

## 2019-08-22 PROCEDURE — 71046 XR CHEST PA AND LATERAL: ICD-10-PCS | Mod: 26,,, | Performed by: RADIOLOGY

## 2019-08-22 PROCEDURE — 71046 X-RAY EXAM CHEST 2 VIEWS: CPT | Mod: 26,,, | Performed by: RADIOLOGY

## 2019-08-22 PROCEDURE — 93010 EKG 12-LEAD: ICD-10-PCS | Mod: S$GLB,,, | Performed by: INTERNAL MEDICINE

## 2019-08-22 PROCEDURE — 93005 ELECTROCARDIOGRAM TRACING: CPT | Mod: S$GLB,,, | Performed by: SURGERY

## 2019-08-23 ENCOUNTER — ANESTHESIA EVENT (OUTPATIENT)
Dept: SURGERY | Facility: HOSPITAL | Age: 79
DRG: 039 | End: 2019-08-23
Payer: MEDICARE

## 2019-08-23 ENCOUNTER — TELEPHONE (OUTPATIENT)
Dept: VASCULAR SURGERY | Facility: CLINIC | Age: 79
End: 2019-08-23

## 2019-08-23 NOTE — ANESTHESIA PREPROCEDURE EVALUATION
[facility]  Anesthesia Pre-Operative Evaluation         Patient Name: Gal Waller  YOB: 1940  MRN: [id number]    SUBJECTIVE:     Pre-operative evaluation for Procedure(s) (LRB):  ENDARTERECTOMY-CAROTID (Left)     08/23/2019    Gal Waller is a 79 y.o. male w/ a significant PMHx of CAD s/p PCI 2017 (stress test 04/2018, findings pertinent for small amount of mild myocardial ischemia in the basilar inferoseptal wall of the left ventricle and normal EF but currently very active and can walk stairs etc without problems) and L CEA stenosis  who presents for the above procedure.      Prev airway: None documented.         Patient Active Problem List   Diagnosis    PAD (peripheral artery disease)    Abnormal cardiovascular stress test    Hypertension    Coronary artery disease involving native coronary artery of native heart without angina pectoris    Carotid stenosis, asymptomatic, bilateral       Review of patient's allergies indicates:   Allergen Reactions    Pcn [penicillins] Swelling       Current Inpatient Medications:      No current facility-administered medications on file prior to encounter.      Current Outpatient Medications on File Prior to Encounter   Medication Sig Dispense Refill    amLODIPine (NORVASC) 10 MG tablet Take 1 tablet (10 mg total) by mouth once daily. (Patient taking differently: Take 10 mg by mouth every morning. ) 90 tablet 3    ascorbic acid, vitamin C, (VITAMIN C) 100 MG tablet Take 100 mg by mouth every morning.       aspirin (ECOTRIN) 81 MG EC tablet Take 81 mg by mouth 2 (two) times daily.      atorvastatin (LIPITOR) 80 MG tablet Take 1 tablet (80 mg total) by mouth once daily. (Patient taking differently: Take 80 mg by mouth daily with dinner or evening meal. ) 90 tablet 3    carvedilol (COREG) 6.25 MG tablet Take 1  tablet (6.25 mg total) by mouth 2 (two) times daily with meals. 180 tablet 3    clopidogrel (PLAVIX) 75 mg tablet TAKE 1 TABLET(75 MG) BY MOUTH EVERY DAY (Patient taking differently: TAKE 1 TABLET(75 MG) BY MOUTH EVERY DAY, morning) 30 tablet 0    fluticasone (FLONASE) 50 mcg/actuation nasal spray 1 spray by Each Nostril route daily as needed.       multivitamin capsule Take 1 capsule by mouth every morning.          Past Surgical History:   Procedure Laterality Date    ANGIOGRAM-EXTREMITY-LOWER N/A 10/23/2017    Performed by [facility] CATH LAB    PTA-PERIPHERAL N/A 2017    [facility] by [facility] at [facility] CATH [facility]    PTA-PERIPHERAL N/A 10/23/2017    [facility] at [facility] CATH [facility]    Stent NAZ coronary N/A 2017    Performed by John [facility] MD at [facility] CATH LAB       Social History     Socioeconomic History    Marital status:      Spouse name: Not on file    Number of children: Not on file    Years of education: Not on file    Highest education level: Not on file   Occupational History    Not on file   Social Needs    Financial resource strain: Not on file    Food insecurity:     Worry: Not on file     Inability: Not on file    Transportation needs:     Medical: Not on file     Non-medical: Not on file   Tobacco Use    Smoking status: Former Smoker     Last attempt to quit: 2019     Years since quittin.0    Smokeless tobacco: Never Used   Substance and Sexual Activity    Alcohol use: No    Drug use: Not on file    Sexual activity: Not on file   Lifestyle    Physical activity:     Days per week: Not on file     Minutes per session: Not on file    Stress: Not on file   Relationships    Social connections:     Talks on phone: Not on file     Gets together: Not on file     Attends Judaism service: Not on file     Active member of club or organization: Not on file     Attends meetings of clubs or organizations: Not  on file     Relationship status: Not on file   Other Topics Concern    Not on file   Social History Narrative    Not on file       OBJECTIVE:     Vital Signs Range (Last 24H):         CBC:   Recent Labs     08/22/19  0928   WBC 7.86   RBC 3.97*   HGB 13.5*   HCT 40.6      *   MCH 34.0*   MCHC 33.3       CMP:   Recent Labs     08/22/19  0928      K 4.2      CO2 27   BUN 17   CREATININE 0.9   GLU 91   CALCIUM 9.5       INR:  No results for input(s): PT, INR, PROTIME, APTT in the last 72 hours.    Diagnostic Studies: No relevant studies.    EKG: No recent studies available.    2D ECHO:  Results for orders placed or performed during the hospital encounter of 04/09/18   2D Echo w/ Color Flow Doppler   Result Value Ref Range    QEF 55 55 - 65    Mitral Valve Regurgitation MILD     Diastolic Dysfunction No     Est. PA Systolic Pressure 26.04     Mitral Valve Mobility NORMAL     Tricuspid Valve Regurgitation MILD          ASSESSMENT/PLAN:         Anesthesia Evaluation    I have reviewed the Patient Summary Reports.    I have reviewed the Nursing Notes.   I have reviewed the Medications.     Review of Systems  Anesthesia Hx:  History of prior surgery of interest to airway management or planning: Denies Family Hx of Anesthesia complications.   Denies Personal Hx of Anesthesia complications.   Hematology/Oncology:  Hematology Normal   Oncology Normal     Cardiovascular:   Hypertension CAD   PVD Stress test 04/2018 with normal EF   Pulmonary:  Pulmonary Normal    Neurological:  Neurology Normal    Endocrine:  Endocrine Normal        Physical Exam  General:  Well nourished    Airway/Jaw/Neck:  Airway Findings: Mouth Opening: Normal Tongue: Normal  Mallampati: II  Improves to I with phonation.  TM Distance: Normal, at least 6 cm      Dental:  Dental Findings: In tact   Chest/Lungs:  Chest/Lungs Findings: Clear to auscultation, Normal Respiratory Rate     Heart/Vascular:  Heart Findings: Rate: Normal   Rhythm: Regular Rhythm     Abdomen:  Abdomen Findings:  Normal, Soft, Nontender     Musculoskeletal:  Musculoskeletal Findings: Normal    Mental Status:  Mental Status Findings:  Cooperative, Alert and Oriented         Anesthesia Plan  Type of Anesthesia, risks & benefits discussed:  Anesthesia Type:  general  Patient's Preference:   Intra-op Monitoring Plan: arterial line and standard ASA monitors  Intra-op Monitoring Plan Comments:   Post Op Pain Control Plan: per primary service following discharge from PACU, IV/PO Opioids PRN and multimodal analgesia  Post Op Pain Control Plan Comments:   Induction:   IV  Beta Blocker:  Patient is on a Beta-Blocker and has received one dose within the past 24 hours (No further documentation required).       Informed Consent: Patient understands risks and agrees with Anesthesia plan.  Questions answered. Anesthesia consent signed with patient.  ASA Score: 3     Day of Surgery Review of History & Physical:    H&P update referred to the surgeon.         Ready For Surgery From Anesthesia Perspective.

## 2019-08-25 DIAGNOSIS — I25.10 CORONARY ARTERY DISEASE, ANGINA PRESENCE UNSPECIFIED, UNSPECIFIED VESSEL OR LESION TYPE, UNSPECIFIED WHETHER NATIVE OR TRANSPLANTED HEART: ICD-10-CM

## 2019-08-25 NOTE — PRE-PROCEDURE INSTRUCTIONS
"Spoke with Patient.  NPO, medication, and pre-op instructions reviewed.  Medications verified against his medicine bottles.  Denies previous problems with Anesthesia.  Is asking that he not receive any post-op "opiods because I am a recovering alcoholic."   Verbalized understanding of instructions.    "

## 2019-08-26 ENCOUNTER — ANESTHESIA (OUTPATIENT)
Dept: SURGERY | Facility: HOSPITAL | Age: 79
DRG: 039 | End: 2019-08-26
Payer: MEDICARE

## 2019-08-26 ENCOUNTER — HOSPITAL ENCOUNTER (INPATIENT)
Facility: HOSPITAL | Age: 79
LOS: 1 days | Discharge: HOME | DRG: 039 | End: 2019-08-27
Attending: SURGERY | Admitting: SURGERY
Payer: MEDICARE

## 2019-08-26 DIAGNOSIS — I67.89 OTHER CEREBROVASCULAR DISEASE: ICD-10-CM

## 2019-08-26 DIAGNOSIS — I65.29 CAROTID STENOSIS: ICD-10-CM

## 2019-08-26 LAB
ABO + RH BLD: NORMAL
BLD GP AB SCN CELLS X3 SERPL QL: NORMAL
POC ACTIVATED CLOTTING TIME K: 175 SEC (ref 74–137)
POC ACTIVATED CLOTTING TIME K: 224 SEC (ref 74–137)
SAMPLE: ABNORMAL
SAMPLE: ABNORMAL

## 2019-08-26 PROCEDURE — 27201037 HC PRESSURE MONITORING SET UP

## 2019-08-26 PROCEDURE — 20600001 HC STEP DOWN PRIVATE ROOM

## 2019-08-26 PROCEDURE — 35301 RECHANNELING OF ARTERY: CPT | Mod: LT,,, | Performed by: SURGERY

## 2019-08-26 PROCEDURE — 71000033 HC RECOVERY, INTIAL HOUR: Performed by: SURGERY

## 2019-08-26 PROCEDURE — 51702 INSERT TEMP BLADDER CATH: CPT

## 2019-08-26 PROCEDURE — 36000706: Performed by: SURGERY

## 2019-08-26 PROCEDURE — 35301 PR THROMBOENDARTECTMY NECK,NECK INCIS: ICD-10-PCS | Mod: LT,,, | Performed by: SURGERY

## 2019-08-26 PROCEDURE — 86901 BLOOD TYPING SEROLOGIC RH(D): CPT

## 2019-08-26 PROCEDURE — D9220A PRA ANESTHESIA: ICD-10-PCS | Mod: ,,, | Performed by: ANESTHESIOLOGY

## 2019-08-26 PROCEDURE — 25000003 PHARM REV CODE 250: Performed by: ANESTHESIOLOGY

## 2019-08-26 PROCEDURE — C1729 CATH, DRAINAGE: HCPCS | Performed by: SURGERY

## 2019-08-26 PROCEDURE — 25000003 PHARM REV CODE 250: Performed by: STUDENT IN AN ORGANIZED HEALTH CARE EDUCATION/TRAINING PROGRAM

## 2019-08-26 PROCEDURE — 36620 ARTERIAL: ICD-10-PCS | Mod: 59,,, | Performed by: ANESTHESIOLOGY

## 2019-08-26 PROCEDURE — 71000039 HC RECOVERY, EACH ADD'L HOUR: Performed by: SURGERY

## 2019-08-26 PROCEDURE — 63600175 PHARM REV CODE 636 W HCPCS: Performed by: STUDENT IN AN ORGANIZED HEALTH CARE EDUCATION/TRAINING PROGRAM

## 2019-08-26 PROCEDURE — 37000008 HC ANESTHESIA 1ST 15 MINUTES: Performed by: SURGERY

## 2019-08-26 PROCEDURE — 63600175 PHARM REV CODE 636 W HCPCS: Performed by: SURGERY

## 2019-08-26 PROCEDURE — 63600175 PHARM REV CODE 636 W HCPCS: Performed by: ANESTHESIOLOGY

## 2019-08-26 PROCEDURE — 51798 US URINE CAPACITY MEASURE: CPT

## 2019-08-26 PROCEDURE — 36620 INSERTION CATHETER ARTERY: CPT | Mod: 59,,, | Performed by: ANESTHESIOLOGY

## 2019-08-26 PROCEDURE — 94761 N-INVAS EAR/PLS OXIMETRY MLT: CPT

## 2019-08-26 PROCEDURE — 37000009 HC ANESTHESIA EA ADD 15 MINS: Performed by: SURGERY

## 2019-08-26 PROCEDURE — D9220A PRA ANESTHESIA: Mod: ,,, | Performed by: ANESTHESIOLOGY

## 2019-08-26 PROCEDURE — 27201423 OPTIME MED/SURG SUP & DEVICES STERILE SUPPLY: Performed by: SURGERY

## 2019-08-26 PROCEDURE — C1768 GRAFT, VASCULAR: HCPCS | Performed by: SURGERY

## 2019-08-26 PROCEDURE — 36000707: Performed by: SURGERY

## 2019-08-26 DEVICE — GRAFT VAS GUARD 0.8X8CM BOVINE: Type: IMPLANTABLE DEVICE | Site: CAROTID | Status: FUNCTIONAL

## 2019-08-26 RX ORDER — ATORVASTATIN CALCIUM 20 MG/1
80 TABLET, FILM COATED ORAL NIGHTLY
Status: DISCONTINUED | OUTPATIENT
Start: 2019-08-26 | End: 2019-08-27 | Stop reason: HOSPADM

## 2019-08-26 RX ORDER — LIDOCAINE HYDROCHLORIDE 10 MG/ML
1 INJECTION, SOLUTION EPIDURAL; INFILTRATION; INTRACAUDAL; PERINEURAL ONCE
Status: COMPLETED | OUTPATIENT
Start: 2019-08-26 | End: 2019-08-26

## 2019-08-26 RX ORDER — GLYCOPYRROLATE 0.2 MG/ML
INJECTION INTRAMUSCULAR; INTRAVENOUS
Status: DISCONTINUED | OUTPATIENT
Start: 2019-08-26 | End: 2019-08-26

## 2019-08-26 RX ORDER — TRAMADOL HYDROCHLORIDE 50 MG/1
TABLET ORAL
Status: DISPENSED
Start: 2019-08-26 | End: 2019-08-26

## 2019-08-26 RX ORDER — KETAMINE HYDROCHLORIDE 100 MG/ML
INJECTION, SOLUTION INTRAMUSCULAR; INTRAVENOUS
Status: DISCONTINUED | OUTPATIENT
Start: 2019-08-26 | End: 2019-08-26

## 2019-08-26 RX ORDER — ACETAMINOPHEN 325 MG/1
650 TABLET ORAL EVERY 6 HOURS
Status: DISCONTINUED | OUTPATIENT
Start: 2019-08-26 | End: 2019-08-27 | Stop reason: HOSPADM

## 2019-08-26 RX ORDER — PROTAMINE SULFATE 10 MG/ML
INJECTION, SOLUTION INTRAVENOUS
Status: DISCONTINUED | OUTPATIENT
Start: 2019-08-26 | End: 2019-08-26

## 2019-08-26 RX ORDER — ROCURONIUM BROMIDE 10 MG/ML
INJECTION, SOLUTION INTRAVENOUS
Status: DISCONTINUED | OUTPATIENT
Start: 2019-08-26 | End: 2019-08-26

## 2019-08-26 RX ORDER — MUPIROCIN 20 MG/G
1 OINTMENT TOPICAL 2 TIMES DAILY
Status: DISCONTINUED | OUTPATIENT
Start: 2019-08-26 | End: 2019-08-27 | Stop reason: HOSPADM

## 2019-08-26 RX ORDER — HEPARIN SODIUM 1000 [USP'U]/ML
INJECTION, SOLUTION INTRAVENOUS; SUBCUTANEOUS
Status: DISCONTINUED | OUTPATIENT
Start: 2019-08-26 | End: 2019-08-26 | Stop reason: HOSPADM

## 2019-08-26 RX ORDER — SODIUM CHLORIDE, SODIUM LACTATE, POTASSIUM CHLORIDE, CALCIUM CHLORIDE 600; 310; 30; 20 MG/100ML; MG/100ML; MG/100ML; MG/100ML
INJECTION, SOLUTION INTRAVENOUS CONTINUOUS
Status: DISCONTINUED | OUTPATIENT
Start: 2019-08-26 | End: 2019-08-27

## 2019-08-26 RX ORDER — HEPARIN SODIUM 1000 [USP'U]/ML
INJECTION, SOLUTION INTRAVENOUS; SUBCUTANEOUS
Status: DISCONTINUED | OUTPATIENT
Start: 2019-08-26 | End: 2019-08-26

## 2019-08-26 RX ORDER — ASPIRIN 81 MG/1
81 TABLET ORAL 2 TIMES DAILY
Status: DISCONTINUED | OUTPATIENT
Start: 2019-08-26 | End: 2019-08-27 | Stop reason: HOSPADM

## 2019-08-26 RX ORDER — PROPOFOL 10 MG/ML
VIAL (ML) INTRAVENOUS
Status: DISCONTINUED | OUTPATIENT
Start: 2019-08-26 | End: 2019-08-26

## 2019-08-26 RX ORDER — SODIUM CHLORIDE 0.9 % (FLUSH) 0.9 %
10 SYRINGE (ML) INJECTION
Status: DISCONTINUED | OUTPATIENT
Start: 2019-08-26 | End: 2019-08-27 | Stop reason: HOSPADM

## 2019-08-26 RX ORDER — FENTANYL CITRATE 50 UG/ML
INJECTION, SOLUTION INTRAMUSCULAR; INTRAVENOUS
Status: DISCONTINUED | OUTPATIENT
Start: 2019-08-26 | End: 2019-08-26

## 2019-08-26 RX ORDER — LIDOCAINE HCL/PF 100 MG/5ML
SYRINGE (ML) INTRAVENOUS
Status: DISCONTINUED | OUTPATIENT
Start: 2019-08-26 | End: 2019-08-26

## 2019-08-26 RX ORDER — CARVEDILOL 6.25 MG/1
6.25 TABLET ORAL 2 TIMES DAILY WITH MEALS
Status: DISCONTINUED | OUTPATIENT
Start: 2019-08-26 | End: 2019-08-27 | Stop reason: HOSPADM

## 2019-08-26 RX ORDER — CLOPIDOGREL BISULFATE 75 MG/1
TABLET ORAL
Qty: 30 TABLET | Refills: 0 | Status: SHIPPED | OUTPATIENT
Start: 2019-08-26 | End: 2019-09-23 | Stop reason: SDUPTHER

## 2019-08-26 RX ORDER — DEXAMETHASONE SODIUM PHOSPHATE 4 MG/ML
INJECTION, SOLUTION INTRA-ARTICULAR; INTRALESIONAL; INTRAMUSCULAR; INTRAVENOUS; SOFT TISSUE
Status: DISCONTINUED | OUTPATIENT
Start: 2019-08-26 | End: 2019-08-26

## 2019-08-26 RX ORDER — ONDANSETRON 2 MG/ML
INJECTION INTRAMUSCULAR; INTRAVENOUS
Status: DISCONTINUED | OUTPATIENT
Start: 2019-08-26 | End: 2019-08-26

## 2019-08-26 RX ORDER — FLUTICASONE PROPIONATE 50 MCG
1 SPRAY, SUSPENSION (ML) NASAL DAILY PRN
Status: DISCONTINUED | OUTPATIENT
Start: 2019-08-26 | End: 2019-08-27 | Stop reason: HOSPADM

## 2019-08-26 RX ORDER — CLOPIDOGREL BISULFATE 75 MG/1
75 TABLET ORAL DAILY
Status: DISCONTINUED | OUTPATIENT
Start: 2019-08-26 | End: 2019-08-26

## 2019-08-26 RX ORDER — AMLODIPINE BESYLATE 10 MG/1
10 TABLET ORAL DAILY
Status: DISCONTINUED | OUTPATIENT
Start: 2019-08-27 | End: 2019-08-27 | Stop reason: HOSPADM

## 2019-08-26 RX ORDER — PHENYLEPHRINE HYDROCHLORIDE 10 MG/ML
INJECTION INTRAVENOUS
Status: DISCONTINUED | OUTPATIENT
Start: 2019-08-26 | End: 2019-08-26

## 2019-08-26 RX ORDER — ACETAMINOPHEN 10 MG/ML
INJECTION, SOLUTION INTRAVENOUS
Status: DISCONTINUED | OUTPATIENT
Start: 2019-08-26 | End: 2019-08-26

## 2019-08-26 RX ORDER — MUPIROCIN 20 MG/G
OINTMENT TOPICAL
Status: DISCONTINUED | OUTPATIENT
Start: 2019-08-26 | End: 2019-08-26

## 2019-08-26 RX ORDER — NEOSTIGMINE METHYLSULFATE 1 MG/ML
INJECTION, SOLUTION INTRAVENOUS
Status: DISCONTINUED | OUTPATIENT
Start: 2019-08-26 | End: 2019-08-26

## 2019-08-26 RX ORDER — TRAMADOL HYDROCHLORIDE 50 MG/1
50 TABLET ORAL EVERY 6 HOURS PRN
Status: DISCONTINUED | OUTPATIENT
Start: 2019-08-26 | End: 2019-08-27 | Stop reason: HOSPADM

## 2019-08-26 RX ORDER — IBUPROFEN 400 MG/1
400 TABLET ORAL EVERY 6 HOURS
Status: DISCONTINUED | OUTPATIENT
Start: 2019-08-26 | End: 2019-08-27 | Stop reason: HOSPADM

## 2019-08-26 RX ORDER — SODIUM CHLORIDE 9 MG/ML
INJECTION, SOLUTION INTRAVENOUS CONTINUOUS PRN
Status: DISCONTINUED | OUTPATIENT
Start: 2019-08-26 | End: 2019-08-26

## 2019-08-26 RX ADMIN — ACETAMINOPHEN 650 MG: 325 TABLET ORAL at 05:08

## 2019-08-26 RX ADMIN — SODIUM CHLORIDE 0.7 MCG/KG/MIN: 9 INJECTION, SOLUTION INTRAVENOUS at 06:08

## 2019-08-26 RX ADMIN — KETAMINE HYDROCHLORIDE 25 MG: 100 INJECTION, SOLUTION, CONCENTRATE INTRAMUSCULAR; INTRAVENOUS at 07:08

## 2019-08-26 RX ADMIN — FENTANYL CITRATE 25 MCG: 50 INJECTION, SOLUTION INTRAMUSCULAR; INTRAVENOUS at 09:08

## 2019-08-26 RX ADMIN — TRAMADOL HYDROCHLORIDE 50 MG: 50 TABLET, FILM COATED ORAL at 11:08

## 2019-08-26 RX ADMIN — DEXAMETHASONE SODIUM PHOSPHATE 8 MG: 4 INJECTION, SOLUTION INTRAMUSCULAR; INTRAVENOUS at 07:08

## 2019-08-26 RX ADMIN — CLOPIDOGREL 75 MG: 75 TABLET, FILM COATED ORAL at 11:08

## 2019-08-26 RX ADMIN — LIDOCAINE HYDROCHLORIDE 10 MG: 10 INJECTION, SOLUTION EPIDURAL; INFILTRATION; INTRACAUDAL; PERINEURAL at 06:08

## 2019-08-26 RX ADMIN — PHENYLEPHRINE HYDROCHLORIDE 200 MCG: 10 INJECTION INTRAVENOUS at 07:08

## 2019-08-26 RX ADMIN — VANCOMYCIN HYDROCHLORIDE 1500 MG: 1.5 INJECTION, POWDER, LYOPHILIZED, FOR SOLUTION INTRAVENOUS at 06:08

## 2019-08-26 RX ADMIN — PROTAMINE SULFATE 55 MG: 10 INJECTION, SOLUTION INTRAVENOUS at 09:08

## 2019-08-26 RX ADMIN — SODIUM CHLORIDE, SODIUM GLUCONATE, SODIUM ACETATE, POTASSIUM CHLORIDE, MAGNESIUM CHLORIDE, SODIUM PHOSPHATE, DIBASIC, AND POTASSIUM PHOSPHATE: .53; .5; .37; .037; .03; .012; .00082 INJECTION, SOLUTION INTRAVENOUS at 07:08

## 2019-08-26 RX ADMIN — SODIUM CHLORIDE: 0.9 INJECTION, SOLUTION INTRAVENOUS at 06:08

## 2019-08-26 RX ADMIN — ONDANSETRON 4 MG: 2 INJECTION INTRAMUSCULAR; INTRAVENOUS at 09:08

## 2019-08-26 RX ADMIN — MUPIROCIN: 20 OINTMENT TOPICAL at 06:08

## 2019-08-26 RX ADMIN — GLYCOPYRROLATE 0.6 MG: 0.2 INJECTION, SOLUTION INTRAMUSCULAR; INTRAVENOUS at 09:08

## 2019-08-26 RX ADMIN — HEPARIN SODIUM 7000 UNITS: 1000 INJECTION, SOLUTION INTRAVENOUS; SUBCUTANEOUS at 08:08

## 2019-08-26 RX ADMIN — PHENYLEPHRINE HYDROCHLORIDE 100 MCG: 10 INJECTION INTRAVENOUS at 07:08

## 2019-08-26 RX ADMIN — ACETAMINOPHEN 1000 MG: 10 INJECTION, SOLUTION INTRAVENOUS at 07:08

## 2019-08-26 RX ADMIN — IBUPROFEN 400 MG: 200 TABLET, FILM COATED ORAL at 05:08

## 2019-08-26 RX ADMIN — IBUPROFEN 400 MG: 200 TABLET, FILM COATED ORAL at 11:08

## 2019-08-26 RX ADMIN — LIDOCAINE HYDROCHLORIDE 100 MG: 20 INJECTION, SOLUTION INTRAVENOUS at 07:08

## 2019-08-26 RX ADMIN — ACETAMINOPHEN 650 MG: 325 TABLET ORAL at 01:08

## 2019-08-26 RX ADMIN — ASPIRIN 81 MG: 81 TABLET, COATED ORAL at 09:08

## 2019-08-26 RX ADMIN — PROPOFOL 100 MG: 10 INJECTION, EMULSION INTRAVENOUS at 07:08

## 2019-08-26 RX ADMIN — ATORVASTATIN CALCIUM 80 MG: 20 TABLET, FILM COATED ORAL at 09:08

## 2019-08-26 RX ADMIN — SODIUM CHLORIDE, SODIUM LACTATE, POTASSIUM CHLORIDE, AND CALCIUM CHLORIDE: .6; .31; .03; .02 INJECTION, SOLUTION INTRAVENOUS at 09:08

## 2019-08-26 RX ADMIN — CARVEDILOL 6.25 MG: 6.25 TABLET, FILM COATED ORAL at 05:08

## 2019-08-26 RX ADMIN — SODIUM CHLORIDE, SODIUM LACTATE, POTASSIUM CHLORIDE, AND CALCIUM CHLORIDE: .6; .31; .03; .02 INJECTION, SOLUTION INTRAVENOUS at 10:08

## 2019-08-26 RX ADMIN — ROCURONIUM BROMIDE 20 MG: 10 INJECTION, SOLUTION INTRAVENOUS at 09:08

## 2019-08-26 RX ADMIN — NEOSTIGMINE METHYLSULFATE 4 MG: 1 INJECTION INTRAVENOUS at 09:08

## 2019-08-26 RX ADMIN — PHENYLEPHRINE HYDROCHLORIDE 50 MCG: 10 INJECTION INTRAVENOUS at 08:08

## 2019-08-26 RX ADMIN — ROCURONIUM BROMIDE 50 MG: 10 INJECTION, SOLUTION INTRAVENOUS at 07:08

## 2019-08-26 RX ADMIN — FENTANYL CITRATE 25 MCG: 50 INJECTION, SOLUTION INTRAMUSCULAR; INTRAVENOUS at 07:08

## 2019-08-26 RX ADMIN — SODIUM CHLORIDE 0.25 MCG/KG/MIN: 9 INJECTION, SOLUTION INTRAVENOUS at 07:08

## 2019-08-26 RX ADMIN — REMIFENTANIL HYDROCHLORIDE 0.3 MCG/KG/MIN: 1 INJECTION, POWDER, LYOPHILIZED, FOR SOLUTION INTRAVENOUS at 07:08

## 2019-08-26 NOTE — NURSING TRANSFER
Nursing Transfer Note      8/26/2019     Transfer 1027a    Transfer via bed    Transfer with IV    Transported by RN    Medicines sent: no    Chart send with patient: yes    Notified: spouse    Patient reassessed at: 8/26/19@1521hrs

## 2019-08-26 NOTE — ANESTHESIA POSTPROCEDURE EVALUATION
Anesthesia Post Evaluation    Patient: Gal Waller    Procedure(s) Performed: Procedure(s) (LRB):  ENDARTERECTOMY-CAROTID (Left)    Final Anesthesia Type: general  Patient location during evaluation: PACU  Patient participation: Yes- Able to Participate  Level of consciousness: awake and alert and oriented  Post-procedure vital signs: reviewed and stable  Pain management: adequate  Airway patency: patent  PONV status at discharge: No PONV  Anesthetic complications: no      Cardiovascular status: blood pressure returned to baseline  Respiratory status: unassisted and spontaneous ventilation  Hydration status: euvolemic  Follow-up not needed.          Vitals Value Taken Time   /58 8/26/2019  1:35 PM   Temp 36.6 °C (97.9 °F) 8/26/2019 12:30 PM   Pulse 51 8/26/2019  2:01 PM   Resp 11 8/26/2019  2:01 PM   SpO2 94 % 8/26/2019  2:01 PM   Vitals shown include unvalidated device data.      No case tracking events are documented in the log.      Pain/Elisabet Score: Pain Rating Prior to Med Admin: 5 (8/26/2019  1:22 PM)  Pain Rating Post Med Admin: 4 (8/26/2019 12:00 PM)  Elisabet Score: 9 (8/26/2019 12:52 PM)

## 2019-08-26 NOTE — BRIEF OP NOTE
[facility]  Brief Operative Note    SUMMARY     Surgery Date: 8/26/2019     Surgeon(s) and Role:     * CELSO Davis III, MD - Primary     * Edil Nicolas MD - Resident - Assisting    Pre-op Diagnosis:  > 90% L carotid stenosis with R ICA occlusion, Asx    Post-op Diagnosis:   Same    Procedure(s) (LRB):  ENDARTERECTOMY-CAROTID (Left)    Anesthesia: General    Description of Procedure: uneventful L CEA    Description of the findings of the procedure:   Tight stenosis of left ICA  Hypoglossal and Vagus in normal anatomic position  8F feeding tube used as shunt  Uneventful endarterectomy  Bovine pericardial patch     Estimated Blood Loss: 75 mL         Specimens:   Specimen (12h ago, onward)    None        Dr. Davis was present for the entire procedure    Edil Nicolas MD PGY6  Vascular and Endovascular Surgery Fellow  08/26/2019

## 2019-08-26 NOTE — INTERVAL H&P NOTE
The patient has been examined and the H&P has been reviewed:    I concur with the findings and no changes have occurred since H&P was written.    Anesthesia/Surgery risks, benefits and alternative options discussed and understood by patient/family.          Active Hospital Problems    Diagnosis  POA    Carotid stenosis [I65.29]  Yes      Resolved Hospital Problems   No resolved problems to display.

## 2019-08-26 NOTE — TRANSFER OF CARE
"Anesthesia Transfer of Care Note    Patient: Gal Waller    Procedure(s) Performed: Procedure(s) (LRB):  ENDARTERECTOMY-CAROTID (Left)    Patient location: PACU    Anesthesia Type: general    Transport from OR: Transported from OR on 2-3 L/min O2 by NC with adequate spontaneous ventilation    Post pain: adequate analgesia    Post assessment: no apparent anesthetic complications    Post vital signs: stable    Level of consciousness: awake    Nausea/Vomiting: no nausea/vomiting    Complications: none    Transfer of care protocol was followed      Last vitals:   Visit Vitals  BP (!) 103/59   Pulse 64   Temp 37.2 °C (99 °F) (Axillary)   Resp 20   Ht 5' 9.5" (1.765 m)   Wt 73.5 kg (162 lb)   SpO2 96%   BMI 23.58 kg/m²     "

## 2019-08-26 NOTE — OP NOTE
8/26/2019    Pre-operative Diagnosis:  Asymptomatic severe left carotid stenosis with right sided carotid occlusion    Post-operative Diagnosis: same.    Procedures:  Left carotid endarterectomy with bovine patch    Surgeon: CELSO Davis MD     Assistants: DENISE Nicolas M.D. PGY6     Anesthesia: General    Findings/Key elements:   1. Severe left carotid stenosis with right sided occlusion  2. 10 Fr shunt placed  3. Bovine pericardial patch used  4. Multiphasic common carotid, external carotid, and internal carotid signals after patch sewn  5. Moved all extremities / tongue midline upon waking up    Implants:   Implant Name Type Inv. Item Serial No.  Lot No. LRB No. Used   GRAFT VAS GUARD 0.8X8CM BOVINE - [id number]  GRAFT VAS GUARD 0.8X8CM BOVINE [id number] Ludei [id number] Left 1             Procedure Details:  The patient was seen in the Holding Room. The risks, benefits, complications, treatment options, and expected outcomes were discussed with the patient. The patient concurred with the proposed plan, giving informed consent.  The site of surgery properly noted/marked.     The patient was taken to the operating room and underwent general anesthesia which they tolerated well. The left neck was prepped and draped in the usual sterile fashion. A Time Out was held and the above information confirmed.     A longitudinal incision was made along the anterior border of the sternocleidomastoid muscle and the dissection carried with electrocautery through the platysma reflecting the sternomastoid posteriorly. The jugular vein was identified and reflected posteriorly by ligating the facial vein identifying the vagus nerve which was carefully avoided and visualized during and at the completion of the procedure and noted to be intact. Sharp dissection was performed down to the level of the common carotid artery. The patient was given 7500 units of heparin. No ACT machine was available  initially to track the patient's anticoagulation, but about detention through the case a working one was found, and confirmed adequate anticoagulation. Umbilical tape was placed around the common carotid artery and dissection continued cephalad. The hypoglossal nerve was identified, encircled with a penrose drain, and care was taken not to injure it. The ICA was encircled with the umbilical tape and the external controlled with Silastic vessel loops. The internal, external and common carotid arteries were clamped in that order. A longitudinal arteriotomy was begun on the common carotid artery and then extended through the bulb and into the internal carotid past the area of disease. A 8-Albanian modified feeding tube was used, placed first in the internal and then the common however, adequate backbleeding was noted.    A suitable endarterectomy plane was established and brought around circumferentially on the common and then sharply divided.  It was then taken up to the external carotid artery where we performed an eversion endarterectomy. The dissection and the endarterectomy then continued up the ICA, where this feathered fairly nicely.  2 tacking stitches of 7-O prolene were required on the superior aspect of the endarterectomy.    Significant time was then taken in copiously irrigating the endarterectomized surface and all loose debris was meticulously removed under loupe magnification. The endarterectomy was then closed using a bovine patch sewn in a running fashion with a 6-0 Prolene suture.      Prior to completion of this, the shunt was removed and there was appropriate back flushing and forward flushing performed. Flow was then restored first to the common, second to the external and finally to the internal, in that order.  There was good hemostasis with no need for further sutures.    Continuous wave Doppler interrogation of the ICA showed continuous diastolic flow.     Protamine reversal was given, and after  meticulous hemostasis was achieved with Surgicel, the incision was closed with a running 3-0 vicryl in the platysma and a running 4-0 monocryl subcuticular stitch.  Sterile dressing was applied and the patient was awakened from anesthesia and taken to recovery in stable condition after moving all extremities    EBL:  75 mL           Complications:  None; patient tolerated the procedure well.           Disposition: Stable to PCU.    Dr. Davis was scrubbed and present for the entire procedure.    Edil Nicolas MD PGY6  Vascular and Endovascular Surgery Fellow  08/26/2019

## 2019-08-26 NOTE — ANESTHESIA PROCEDURE NOTES
Arterial    Diagnosis: carotid stenosis     Patient location during procedure: done in OR  Procedure start time: 8/26/2019 7:06 AM  Timeout: 8/26/2019 7:05 AM  Procedure end time: 8/26/2019 7:12 AM    Staffing  Authorizing Provider: SLIM Pride MD  Performing Provider: Suzette Huff MD    Anesthesiologist was present at the time of the procedure.    Preanesthetic Checklist  Completed: patient identified, site marked, surgical consent, pre-op evaluation, timeout performed, IV checked, risks and benefits discussed, monitors and equipment checked and anesthesia consent givenArterial  Skin Prep: chlorhexidine gluconate  Local Infiltration: lidocaine  Orientation: left  Location: radial  Catheter Size: 20 G  Catheter placement by Anatomical landmarks. Heme positive aspiration all ports.Insertion Attempts: 1

## 2019-08-27 VITALS
RESPIRATION RATE: 22 BRPM | BODY MASS INDEX: 23.19 KG/M2 | WEIGHT: 162 LBS | OXYGEN SATURATION: 97 % | HEIGHT: 70 IN | SYSTOLIC BLOOD PRESSURE: 141 MMHG | HEART RATE: 52 BPM | TEMPERATURE: 97 F | DIASTOLIC BLOOD PRESSURE: 64 MMHG

## 2019-08-27 LAB
BASOPHILS # BLD AUTO: 0.02 K/UL (ref 0–0.2)
BASOPHILS NFR BLD: 0.1 % (ref 0–1.9)
DIFFERENTIAL METHOD: ABNORMAL
EOSINOPHIL # BLD AUTO: 0 K/UL (ref 0–0.5)
EOSINOPHIL NFR BLD: 0 % (ref 0–8)
ERYTHROCYTE [DISTWIDTH] IN BLOOD BY AUTOMATED COUNT: 12.6 % (ref 11.5–14.5)
HCT VFR BLD AUTO: 35.8 % (ref 40–54)
HGB BLD-MCNC: 11.7 G/DL (ref 14–18)
IMM GRANULOCYTES # BLD AUTO: 0.05 K/UL (ref 0–0.04)
IMM GRANULOCYTES NFR BLD AUTO: 0.4 % (ref 0–0.5)
LYMPHOCYTES # BLD AUTO: 0.8 K/UL (ref 1–4.8)
LYMPHOCYTES NFR BLD: 6 % (ref 18–48)
MCH RBC QN AUTO: 33.9 PG (ref 27–31)
MCHC RBC AUTO-ENTMCNC: 32.7 G/DL (ref 32–36)
MCV RBC AUTO: 104 FL (ref 82–98)
MONOCYTES # BLD AUTO: 1.7 K/UL (ref 0.3–1)
MONOCYTES NFR BLD: 12.5 % (ref 4–15)
NEUTROPHILS # BLD AUTO: 11 K/UL (ref 1.8–7.7)
NEUTROPHILS NFR BLD: 81 % (ref 38–73)
NRBC BLD-RTO: 0 /100 WBC
PLATELET # BLD AUTO: 148 K/UL (ref 150–350)
PMV BLD AUTO: 9.7 FL (ref 9.2–12.9)
RBC # BLD AUTO: 3.45 M/UL (ref 4.6–6.2)
WBC # BLD AUTO: 13.61 K/UL (ref 3.9–12.7)

## 2019-08-27 PROCEDURE — 85025 COMPLETE CBC W/AUTO DIFF WBC: CPT

## 2019-08-27 PROCEDURE — 25000003 PHARM REV CODE 250: Performed by: STUDENT IN AN ORGANIZED HEALTH CARE EDUCATION/TRAINING PROGRAM

## 2019-08-27 PROCEDURE — 51798 US URINE CAPACITY MEASURE: CPT

## 2019-08-27 PROCEDURE — 36415 COLL VENOUS BLD VENIPUNCTURE: CPT

## 2019-08-27 RX ORDER — TRAMADOL HYDROCHLORIDE 50 MG/1
50 TABLET ORAL EVERY 6 HOURS PRN
Qty: 15 TABLET | Refills: 0 | Status: SHIPPED | OUTPATIENT
Start: 2019-08-27 | End: 2020-01-21

## 2019-08-27 RX ORDER — CLOPIDOGREL BISULFATE 75 MG/1
75 TABLET ORAL DAILY
Status: DISCONTINUED | OUTPATIENT
Start: 2019-08-27 | End: 2019-08-27 | Stop reason: HOSPADM

## 2019-08-27 RX ADMIN — IBUPROFEN 400 MG: 200 TABLET, FILM COATED ORAL at 12:08

## 2019-08-27 RX ADMIN — CARVEDILOL 6.25 MG: 6.25 TABLET, FILM COATED ORAL at 09:08

## 2019-08-27 RX ADMIN — AMLODIPINE BESYLATE 10 MG: 10 TABLET ORAL at 09:08

## 2019-08-27 RX ADMIN — IBUPROFEN 400 MG: 200 TABLET, FILM COATED ORAL at 05:08

## 2019-08-27 RX ADMIN — CLOPIDOGREL 75 MG: 75 TABLET, FILM COATED ORAL at 09:08

## 2019-08-27 RX ADMIN — MUPIROCIN 1 G: 20 OINTMENT TOPICAL at 09:08

## 2019-08-27 RX ADMIN — ACETAMINOPHEN 650 MG: 325 TABLET ORAL at 01:08

## 2019-08-27 RX ADMIN — ACETAMINOPHEN 650 MG: 325 TABLET ORAL at 06:08

## 2019-08-27 RX ADMIN — ASPIRIN 81 MG: 81 TABLET, COATED ORAL at 09:08

## 2019-08-27 NOTE — DISCHARGE SUMMARY
[facility]  Vascular Surgery  Discharge Summary      Patient Name: Gal Waller  MRN: [id number]  Admission Date: 8/26/2019  Hospital Length of Stay: 1 days  Discharge Date and Time:  08/27/2019 8:29 AM  Attending Physician: CELSO Davis III, MD   Discharging Provider: Mary Morton MD  Primary Care Provider: Talita Arita MD     HPI: A 79-year-old male sent for evaluation of carotid disease.  He was recently found to have a right chronic ICA occlusion and greater than 90% left carotid stenosis based on CTA and duplex imaging.  He has no history of stroke, TIA or amaurosis.     He does have known coronary artery disease, status post PCI in 2017.  He had a stress test in 2018 showing normal ejection fraction and a very small focus of ischemia at risk.     He has excellent exercise tolerance, however, cuts his own grass, walk a flight of stairs without any problems.     He now returns from his initial visit and is ready to proceed with CEA.  In the last week, he had a transient (5 sec) RIGHT eye visual disturbance    Procedure(s) (LRB):  ENDARTERECTOMY-CAROTID (Left)     Hospital Course: Patient presented as above given significant carotid stenosis on the L and chronic occlusion of ICA on the right. As such he was taken for a L CEA which was performed without any apparent intra-operative complication. Following the case he was taken to PACU and then the stepdown floor unit where labs and vitals remained stable and appropriate. Diet was advanced and well tolerated. Pain controlled with oral medications. Given this he was deemed stable for discharge home with follow up.     Physical Exam   Constitutional: He is oriented to person, place, and time and well-developed, well-nourished, and in no distress.   HENT:   Head: Normocephalic and atraumatic.   Right Ear: External ear normal.   Left Ear: External ear normal.   Eyes: Pupils are equal, round, and reactive to light. Conjunctivae and  EOM are normal.   Neck: Normal range of motion. Neck supple. No thyromegaly present.   L neck with clean dressing in place, no signs of significant hematoma, soft, non-edematous   Cardiovascular: Normal rate and regular rhythm.   No murmur heard.  Pulmonary/Chest: Effort normal. No respiratory distress.   Abdominal: Soft. He exhibits no distension. There is no tenderness.   Musculoskeletal: Normal range of motion. He exhibits no edema.   Neurological: He is alert and oriented to person, place, and time. GCS score is 15.   Skin: Skin is warm and dry. No erythema.         Consults: None    Significant Diagnostic Studies: Labs:   BMP: No results for input(s): GLU, NA, K, CL, CO2, BUN, CREATININE, CALCIUM, MG in the last 48 hours. and CBC   Recent Labs   Lab 08/27/19  0537   WBC 13.61*   HGB 11.7*   HCT 35.8*   *       Pending Diagnostic Studies:     None        Final Active Diagnoses:    Diagnosis Date Noted POA    PRINCIPAL PROBLEM:  Carotid stenosis [I65.29] 08/26/2019 Yes      Problems Resolved During this Admission:      Discharged Condition: good    Disposition:     Follow Up:  Follow-up Information     W Medhat Davis Iii, MD In 4 weeks.    Specialty:  Vascular Surgery  Contact information:  [address] [place] [address] [id number] [phone]                 Patient Instructions:      VAS US Carotid Bilateral   Standing Status: Future Standing Exp. Date: 08/27/20     Notify your health care provider if you experience any of the following:  increased confusion or weakness     Notify your health care provider if you experience any of the following:  persistent dizziness, light-headedness, or visual disturbances     Notify your health care provider if you experience any of the following:  worsening rash     Notify your health care provider if you experience any of the following:  severe persistent headache     Notify your health care provider if you experience any of the following:  difficulty  breathing or increased cough     Notify your health care provider if you experience any of the following:  redness, tenderness, or signs of infection (pain, swelling, redness, odor or green/yellow discharge around incision site)     Notify your health care provider if you experience any of the following:  severe uncontrolled pain     Notify your health care provider if you experience any of the following:  persistent nausea and vomiting or diarrhea     Notify your health care provider if you experience any of the following:  temperature >100.4     Remove dressing in 24 hours     Activity as tolerated     Medications:  Reconciled Home Medications:      Medication List      START taking these medications    traMADol 50 mg tablet  Commonly known as:  ULTRAM  Take 1 tablet (50 mg total) by mouth every 6 (six) hours as needed.        CHANGE how you take these medications    amLODIPine 10 MG tablet  Commonly known as:  NORVASC  Take 1 tablet (10 mg total) by mouth once daily.  What changed:  when to take this     atorvastatin 80 MG tablet  Commonly known as:  LIPITOR  Take 1 tablet (80 mg total) by mouth once daily.  What changed:  when to take this     clopidogrel 75 mg tablet  Commonly known as:  PLAVIX  TAKE 1 TABLET(75 MG) BY MOUTH EVERY DAY  What changed:  See the new instructions.        CONTINUE taking these medications    aspirin 81 MG EC tablet  Commonly known as:  ECOTRIN  Take 81 mg by mouth 2 (two) times daily.     carvedilol 6.25 MG tablet  Commonly known as:  COREG  Take 1 tablet (6.25 mg total) by mouth 2 (two) times daily with meals.     fluticasone propionate 50 mcg/actuation nasal spray  Commonly known as:  FLONASE  1 spray by Each Nostril route daily as needed.     multivitamin capsule  Take 1 capsule by mouth every morning.     VITAMIN C 100 MG tablet  Generic drug:  ascorbic acid (vitamin C)  Take 100 mg by mouth every morning.            Mary Morton MD  General Surgery  [facility]  [place]

## 2019-08-27 NOTE — PLAN OF CARE
Problem: Adult Inpatient Plan of Care  Goal: Plan of Care Review  Patient alert and oriented x 4.  Vital signs stable. Continues on telemetry with sinus bradycardia with heart rate in the 40's.  MD aware of low heart rate.  LR infusing per pump at 100 ml/hr.  Left neck incision with clean dry dressing intact.  Denies pain or discomfort.  Receives acetaminophen and ibuprofen routinely for pain management.   No new complaints or concerns voiced.  Will continue to monitor.

## 2019-08-27 NOTE — PLAN OF CARE
08/27/19 1024   Post-Acute Status   Post-Acute Authorization Other   Other Status No Post-Acute Service Needs     No SW needs identified at this time.    Myrna Franks LMSW  [facility]  [id number]

## 2019-08-27 NOTE — PLAN OF CARE
Extended Emergency Contact Information  Primary Emergency Contact: Selam Waller  Address: [address] [place] [address]           SA[place] ROSADO [address] [facility]  Home Phone: [phone]  Mobile Phone: [phone]  Relation: Spouse    Talita Arita MD  [address] [address] / [place] [address]    Future Appointments   Date Time Provider Department Center   9/3/2019  1:00 PM Gale Prajapati [facility] [place] [place] S[facility] [place]   9/24/2019  9:00 AM VASCULAR, LAB [facility] [address] [address]   9/24/2019  9:45 AM CELSO Davis III, MD [facility] [address]     Payor: [employer] MANAGED MEDICARE / Plan: HU[employer] MEDICARE HMO / Product Type: [employer] /       [id number] - [place] - [address] AT [facility] [address]  [address]  [place] [address]  Phone: [phone] Fax: [phone]       08/27/19 4642   Discharge Assessment   Assessment Type Discharge Planning Assessment   Confirmed/corrected address and phone number on facesheet? No   Assessment information obtained from? Medical Record   Expected Length of Stay (days) 1   Communicated expected length of stay with patient/caregiver no   Prior to hospitilization cognitive status: Alert/Oriented   Prior to hospitalization functional status: Independent   Current cognitive status: Alert/Oriented   Current Functional Status: Independent   Lives With spouse   Able to Return to Prior Arrangements yes   Is patient able to care for self after discharge? Yes   Patient's perception of discharge disposition home or selfcare   Readmission Within the Last 30 Days no previous admission in last 30 days   Patient currently being followed by outpatient case management? No   Patient currently receives any other outside agency services? No   Equipment Currently Used at Home none   Do you have any problems affording any of your prescribed medications? No   Is the patient taking medications as prescribed? yes   Does the patient have transportation home? Yes    Transportation Anticipated family or friend will provide   Does the patient receive services at the [facility]mad[facility] Clinic? No   Discharge Plan A Home   Discharge Plan B Home   Patient/Family in Agreement with Plan unable to assess

## 2019-09-03 ENCOUNTER — TELEPHONE (OUTPATIENT)
Dept: VASCULAR SURGERY | Facility: CLINIC | Age: 79
End: 2019-09-03

## 2019-09-03 ENCOUNTER — CLINICAL SUPPORT (OUTPATIENT)
Dept: SMOKING CESSATION | Facility: CLINIC | Age: 79
End: 2019-09-03
Payer: COMMERCIAL

## 2019-09-03 DIAGNOSIS — F17.200 NICOTINE DEPENDENCE: Primary | ICD-10-CM

## 2019-09-03 PROCEDURE — 99407 PR TOBACCO USE CESSATION INTENSIVE >10 MINUTES: ICD-10-PCS | Mod: S$GLB,,,

## 2019-09-03 PROCEDURE — 99407 BEHAV CHNG SMOKING > 10 MIN: CPT | Mod: S$GLB,,,

## 2019-09-03 NOTE — TELEPHONE ENCOUNTER
Contacted patient's wife in response to message. States patient has had swollen tongue, difficulty with speech, and change in taste buds since surgery. Notified wife this can be due to anesthesia and should wear off soon, but nurse is unable to give definite timeline. Also asks if patient can drive. Notified wife that patient may not drive or do any strenuous activity at this time. Wife verbalizes understanding.----- Message from Bhavna Rico sent at 9/3/2019 11:46 AM CDT -----  Contact: Pts wife  Type:  Needs Medical Advice    Who Called: Pts wife    Symptoms (please be specific): Patient speech is slurred and taste buds are off    How long has patient had these symptoms: Since surgery    Best Call Back Number: [phone]

## 2019-09-23 DIAGNOSIS — I25.10 CORONARY ARTERY DISEASE, ANGINA PRESENCE UNSPECIFIED, UNSPECIFIED VESSEL OR LESION TYPE, UNSPECIFIED WHETHER NATIVE OR TRANSPLANTED HEART: ICD-10-CM

## 2019-09-23 RX ORDER — CLOPIDOGREL BISULFATE 75 MG/1
TABLET ORAL
Qty: 30 TABLET | Refills: 0 | Status: SHIPPED | OUTPATIENT
Start: 2019-09-23 | End: 2019-10-28 | Stop reason: SDUPTHER

## 2019-09-24 ENCOUNTER — OFFICE VISIT (OUTPATIENT)
Dept: VASCULAR SURGERY | Facility: CLINIC | Age: 79
End: 2019-09-24
Payer: MEDICARE

## 2019-09-24 ENCOUNTER — HOSPITAL ENCOUNTER (OUTPATIENT)
Dept: VASCULAR SURGERY | Facility: CLINIC | Age: 79
Discharge: HOME | End: 2019-09-24
Attending: SURGERY
Payer: MEDICARE

## 2019-09-24 ENCOUNTER — HOSPITAL ENCOUNTER (OUTPATIENT)
Dept: CARDIOLOGY | Facility: CLINIC | Age: 79
Discharge: HOME | End: 2019-09-24
Payer: MEDICARE

## 2019-09-24 VITALS
WEIGHT: 163.13 LBS | SYSTOLIC BLOOD PRESSURE: 156 MMHG | HEIGHT: 69 IN | TEMPERATURE: 98 F | HEART RATE: 30 BPM | DIASTOLIC BLOOD PRESSURE: 69 MMHG | BODY MASS INDEX: 24.16 KG/M2

## 2019-09-24 DIAGNOSIS — Z98.890 S/P CAROTID ENDARTERECTOMY: ICD-10-CM

## 2019-09-24 DIAGNOSIS — Z01.818 PREOP EXAMINATION: Primary | ICD-10-CM

## 2019-09-24 DIAGNOSIS — I65.23 CAROTID STENOSIS, ASYMPTOMATIC, BILATERAL: ICD-10-CM

## 2019-09-24 DIAGNOSIS — Z98.890 S/P CAROTID ENDARTERECTOMY: Primary | ICD-10-CM

## 2019-09-24 PROCEDURE — 93880 PR DUPLEX SCAN EXTRACRANIAL,BILAT: ICD-10-PCS | Mod: S$GLB,,, | Performed by: SURGERY

## 2019-09-24 PROCEDURE — 99024 PR POST-OP FOLLOW-UP VISIT: ICD-10-PCS | Mod: S$GLB,,, | Performed by: SURGERY

## 2019-09-24 PROCEDURE — 93005 ELECTROCARDIOGRAM TRACING: CPT | Mod: S$GLB,,, | Performed by: SURGERY

## 2019-09-24 PROCEDURE — 93005 RHYTHM STRIP: ICD-10-PCS | Mod: S$GLB,,, | Performed by: SURGERY

## 2019-09-24 PROCEDURE — 99999 PR PBB SHADOW E&M-EST. PATIENT-LVL III: CPT | Mod: PBBFAC,,, | Performed by: SURGERY

## 2019-09-24 PROCEDURE — 99024 POSTOP FOLLOW-UP VISIT: CPT | Mod: S$GLB,,, | Performed by: SURGERY

## 2019-09-24 PROCEDURE — 93010 ELECTROCARDIOGRAM REPORT: CPT | Mod: S$GLB,,, | Performed by: INTERNAL MEDICINE

## 2019-09-24 PROCEDURE — 93010 RHYTHM STRIP: ICD-10-PCS | Mod: S$GLB,,, | Performed by: INTERNAL MEDICINE

## 2019-09-24 PROCEDURE — 99999 PR PBB SHADOW E&M-EST. PATIENT-LVL III: ICD-10-PCS | Mod: PBBFAC,,, | Performed by: SURGERY

## 2019-09-24 PROCEDURE — 93880 EXTRACRANIAL BILAT STUDY: CPT | Mod: S$GLB,,, | Performed by: SURGERY

## 2019-09-24 NOTE — PROGRESS NOTES
REFERRING PHYSICIAN:  John Small M.D.    HISTORY OF PRESENT ILLNESS:  A 79-year-old male sent for evaluation of carotid   disease.  He was recently found to have a right chronic ICA occlusion and   greater than 90% left carotid stenosis based on CTA and duplex imaging.  He has   no history of stroke, TIA or amaurosis.    He does have known coronary artery disease, status post PCI in 2017.  He had a   stress test in 2018 showing normal ejection fraction and a very small focus of   ischemia at risk.    He has excellent exercise tolerance, however, cuts his own grass, walk a flight   of stairs without any problems.    He now returns, s/p L CEA 8/26/19    HE denies stroke/TIA/amaurosis.  He is having some issue with speech clarity, that his wife says is improving.  No issues with eating/swallowing.  No hoarseness.    No SOB/dizziness    PAST MEDICAL HISTORY:  1.  Coronary artery disease, status post PCI as above.  2.  Hypertension.  3.  History of alcohol abuse  4.  Hypothyroidism.    PAST SURGICAL HISTORY:  Nil.    FAMILY HISTORY:  Nil.    SOCIAL HISTORY:  Cont smoke-free x 6 weeks !    MEDICATIONS:  Include aspirin, Plavix and statin.  See EPIC for full list.    ALLERGIES:  PENICILLIN.    REVIEW OF SYSTEMS:  Denies fever, pain or DVT.  All other systems including eyes, ENT, respiratory, musculoskeletal,   psychiatric, lymph, allergy and immune are negative.    PHYSICAL EXAMINATION:  VITAL SIGNS:  See nursing notes. His HR feels c/w bigeminy   GENERAL:  He is in no acute distress.  RESPIRATORY:  Normal effort.  Clear to auscultation.  CARDIAC:  Regular rate and rhythm.  Nondisplaced PMI.  No murmur.  VASCULAR:  2+ radial pulses, although distal radial artery still very calcified.  ABDOMEN:  No mass or tenderness.  No hepatosplenomegaly.  Aorta cannot be   palpated.  EYES:  Normal conjunctivae and lids.  ENT:  Fair dentition.  NECK:  Well healed L cervical incision  MUSCULOSKELETAL:  No kyphosis or scoliosis.   Extremities are without clubbing or   cyanosis.  SKIN:  Warm and dry.  NEUROLOGIC:  Alert and oriented x3.  Normal mood and affect.      No speech difficulty or hoarseness and 5/5 motor strength in all extremities. Tongue has a slight L drift    IMAGING:  No residual L carotid stenosis.  Low EDV noted, no prox stenosis or tardus parvus waveforms to suggest prox stenosis.  R chronic ICA occlusion    ASSESSMENT:  1.  1 month s/p L CEA  2.  Hypoglossal neuropraxia  3. ? Bigeminy by palpation, Asx     I reassured him that this hypoglossal neurapraxia she continue to improve over the next 1-3 months    PLAN:    1. EKG now  2. 3 month f/u with carotid duplex  3. Cont DAPT and statin    W. Medhat Davis III, MD, FACS  Professor and Chief, Vascular and Endovascular Surgery

## 2019-09-25 ENCOUNTER — TELEPHONE (OUTPATIENT)
Dept: VASCULAR SURGERY | Facility: CLINIC | Age: 79
End: 2019-09-25

## 2019-09-25 DIAGNOSIS — I49.8 BIGEMINY: Primary | ICD-10-CM

## 2019-09-25 NOTE — TELEPHONE ENCOUNTER
Attempted to contact patient to notify him that a referral to cardiology has been placed due to bigeminy noted on EKG and rhythm strip. Voice message left for patient to notify him that he should receive call from cardiology department to schedule appt. ----- Message from CELSO Davis III, MD sent at 9/25/2019  5:21 AM CDT -----  Please put in cardiology consult for bigeminy

## 2019-09-27 ENCOUNTER — TELEPHONE (OUTPATIENT)
Dept: VASCULAR SURGERY | Facility: CLINIC | Age: 79
End: 2019-09-27

## 2019-09-27 NOTE — TELEPHONE ENCOUNTER
Contacted patient's wife in response to message. Notified wife that arhythmia was noted on EKG and patient has been scheduled for appt with cardiology. States she is aware and that patient did not tell her this until after she called clinic.   ----- Message from Jens Churchill sent at 9/27/2019  8:09 AM CDT -----  Contact: Selam (Wife) [phone]  Selam called to speak to someone regarding the patient's EKG results and referral to cardiology. Please contact her to discuss further.

## 2019-10-14 ENCOUNTER — OFFICE VISIT (OUTPATIENT)
Dept: CARDIOLOGY | Facility: CLINIC | Age: 79
End: 2019-10-14
Payer: MEDICARE

## 2019-10-14 VITALS
HEIGHT: 70 IN | SYSTOLIC BLOOD PRESSURE: 158 MMHG | HEART RATE: 63 BPM | BODY MASS INDEX: 24.3 KG/M2 | DIASTOLIC BLOOD PRESSURE: 72 MMHG | WEIGHT: 169.75 LBS

## 2019-10-14 DIAGNOSIS — I10 HYPERTENSION, UNSPECIFIED TYPE: Primary | ICD-10-CM

## 2019-10-14 PROCEDURE — 99213 PR OFFICE/OUTPT VISIT, EST, LEVL III, 20-29 MIN: ICD-10-PCS | Mod: GC,S$GLB,, | Performed by: INTERNAL MEDICINE

## 2019-10-14 PROCEDURE — 99999 PR PBB SHADOW E&M-EST. PATIENT-LVL III: ICD-10-PCS | Mod: PBBFAC,GC,, | Performed by: INTERNAL MEDICINE

## 2019-10-14 PROCEDURE — 99999 PR PBB SHADOW E&M-EST. PATIENT-LVL III: CPT | Mod: PBBFAC,GC,, | Performed by: INTERNAL MEDICINE

## 2019-10-14 PROCEDURE — 99213 OFFICE O/P EST LOW 20 MIN: CPT | Mod: GC,S$GLB,, | Performed by: INTERNAL MEDICINE

## 2019-10-14 RX ORDER — CANDESARTAN 8 MG/1
8 TABLET ORAL DAILY
Qty: 90 TABLET | Refills: 3 | Status: SHIPPED | OUTPATIENT
Start: 2019-10-14 | End: 2020-01-21

## 2019-10-14 NOTE — PROGRESS NOTES
"    Cardiology Clinic Note  Reason for Visit: Maria Guadalupe    HPI:   78 y/o male with PMH of PAD s/p right MARGARITO and EIA stenting 11/2017, CAD s/p NAZ to dLM and pLAD (oRCA  unable to be crossed with 99% stenosis), CVD s/p left CEA for left ICA stenosis in 8/2019 (totally occluded right ICA), and anemia who presents for f/u. [place] 54 [place] [facility] [place] The patient denies palpitations and quit smoking in July. The patient does not exercise. After the surgery, he experienced speech difficulties and "drooling". He reports that the symptoms are improving gradually. The patient denies CP and LOZA. Vascular asked the patient to visit cardiology for bigeminy noted in their clinic.     Review of Systems   Constitution: Negative for chills and fever.   HENT: Negative for ear discharge.    Eyes: Negative for pain and visual disturbance.   Cardiovascular: Positive for claudication. Negative for chest pain, dyspnea on exertion, irregular heartbeat, leg swelling, orthopnea, palpitations, paroxysmal nocturnal dyspnea and syncope.   Respiratory: Negative for hemoptysis, shortness of breath and wheezing.    Skin: Negative for rash and suspicious lesions.   Musculoskeletal: Negative for joint pain and muscle weakness.   Gastrointestinal: Negative for abdominal pain, diarrhea, nausea and vomiting.   Genitourinary: Negative for dysuria and frequency.   Neurological: Negative for focal weakness, headaches and tremors.        +expressive aphasia       PMH:     Past Medical History:   Diagnosis Date    Carotid stenosis, bilateral     Coronary artery disease     History of alcohol abuse     Hypertension     Hyperthyroidism     PAD (peripheral artery disease)      Past Surgical History:   Procedure Laterality Date    CAROTID ENDARTERECTOMY Left 8/26/2019    Procedure: ENDARTERECTOMY-CAROTID;  Surgeon: CELSO Davis III, MD;  Location: [facility] OR [address] [place] [address];  Service: Peripheral Vascular;  Laterality: Left;    CORONARY " "ANGIOPLASTY WITH STENT PLACEMENT       Allergies:     Review of patient's allergies indicates:   Allergen Reactions    Pcn [penicillins] Swelling     Shoulder swelling,  Has taken Penicillin since without any reaction    Shellfish containing products Other (See Comments)     Feels flushed,  "Get an all body warmness"     Medications:     Current Outpatient Medications on File Prior to Visit   Medication Sig Dispense Refill    amLODIPine (NORVASC) 10 MG tablet Take 1 tablet (10 mg total) by mouth once daily. (Patient taking differently: Take 10 mg by mouth every morning. ) 90 tablet 3    ascorbic acid, vitamin C, (VITAMIN C) 100 MG tablet Take 100 mg by mouth every morning.       aspirin (ECOTRIN) 81 MG EC tablet Take 81 mg by mouth 2 (two) times daily.      atorvastatin (LIPITOR) 80 MG tablet Take 1 tablet (80 mg total) by mouth once daily. (Patient taking differently: Take 80 mg by mouth daily with dinner or evening meal. ) 90 tablet 3    carvedilol (COREG) 6.25 MG tablet Take 1 tablet (6.25 mg total) by mouth 2 (two) times daily with meals. 180 tablet 3    clopidogrel (PLAVIX) 75 mg tablet TAKE 1 TABLET(75 MG) BY MOUTH EVERY DAY 30 tablet 0    fluticasone (FLONASE) 50 mcg/actuation nasal spray 1 spray by Each Nostril route daily as needed.       multivitamin capsule Take 1 capsule by mouth every morning.       traMADol (ULTRAM) 50 mg tablet Take 1 tablet (50 mg total) by mouth every 6 (six) hours as needed. (Patient not taking: Reported on 10/14/2019) 15 tablet 0     No current facility-administered medications on file prior to visit.      Social History:     Social History     Tobacco Use    Smoking status: Former Smoker     Last attempt to quit: 2019     Years since quittin.1    Smokeless tobacco: Never Used   Substance Use Topics    Alcohol use: No     Family History:   No family history on file.    Physical Exam   Constitutional: He is oriented to person, place, and time. He appears " "well-developed and well-nourished.   HENT:   Head: Normocephalic and atraumatic.   Eyes: EOM are normal.   Cardiovascular: Normal rate. An irregularly irregular rhythm present. Exam reveals no gallop and no friction rub.   Pulses:       Posterior tibial pulses are 1+ on the right side, and 1+ on the left side.   No ulcers or wounds of the bilateral feet. Both feet are warm.    Pulmonary/Chest: Effort normal and breath sounds normal. No stridor. He has no wheezes. He has no rales.   Abdominal: Soft. Bowel sounds are normal. There is no rebound and no guarding.   Musculoskeletal: He exhibits no edema.   Neurological: He is alert and oriented to person, place, and time. No cranial nerve deficit.   Skin: Skin is warm and dry.   Psychiatric: He has a normal mood and affect. His behavior is normal.     BP (!) 158/72 (BP Location: Left arm, Patient Position: Sitting, BP Method: Large (Automatic))   Pulse 63   Ht 5' 9.5" (1.765 m)   Wt 77 kg (169 lb 12.1 oz)   BMI 24.71 kg/m²          Labs:     Lab Results   Component Value Date     08/22/2019    K 4.2 08/22/2019     08/22/2019    CO2 27 08/22/2019    BUN 17 08/22/2019    CREATININE 0.9 08/22/2019    ANIONGAP 10 08/22/2019     No results found for: HGBA1C  No results found for: BNP, BNPTRIAGEBLO Lab Results   Component Value Date    WBC 13.61 (H) 08/27/2019    HGB 11.7 (L) 08/27/2019    HCT 35.8 (L) 08/27/2019     (L) 08/27/2019    GRAN 11.0 (H) 08/27/2019    GRAN 81.0 (H) 08/27/2019     No results found for: CHOL, HDL, LDLCALC, TRIG       No results found for: EF    Last EKG: LAE, LAD, IVCD, PVC in a bigeminy pattern, SR    Assessment and Plan  Gal Waller is a 78 y/o male with PMH of PAD s/p right MARGARITO and EIA stenting 11/2017, CAD s/p NAZ to dLM and pLAD (oRCA  unable to be crossed with 99% stenosis), CVD s/p left CEA for left ICA stenosis in 8/2019 (totally occluded right ICA), and anemia who presents for f/u.     CAD  - c/w ASA, " statin, carvedilol, and Plavix  - no angina presently     PAD  - c/w aforementioned meds  - recently had a left CEA  - f/u with vascular surgery (patient reports expressive aphasia since surgery is improving)     HTN  - c/w amlodipine and Coreg  - obtain BMP and start on candesartan if appropriate     HLD  - c/w full potency statin     PVCs; bigeminy   - obtain BMP and obtain 48H Holter    Signed:    Luis Manuel Quijano

## 2019-10-21 ENCOUNTER — CLINICAL SUPPORT (OUTPATIENT)
Dept: CARDIOLOGY | Facility: HOSPITAL | Age: 79
End: 2019-10-21
Attending: INTERNAL MEDICINE
Payer: MEDICARE

## 2019-10-21 DIAGNOSIS — I10 HYPERTENSION, UNSPECIFIED TYPE: ICD-10-CM

## 2019-10-21 PROCEDURE — 93227 HOLTER MONITOR - 48 HOUR (CUPID ONLY): ICD-10-PCS | Mod: ,,, | Performed by: INTERNAL MEDICINE

## 2019-10-21 PROCEDURE — 93226 XTRNL ECG REC<48 HR SCAN A/R: CPT

## 2019-10-21 PROCEDURE — 93227 XTRNL ECG REC<48 HR R&I: CPT | Mod: ,,, | Performed by: INTERNAL MEDICINE

## 2019-10-25 ENCOUNTER — TELEPHONE (OUTPATIENT)
Dept: SMOKING CESSATION | Facility: CLINIC | Age: 79
End: 2019-10-25

## 2019-10-28 DIAGNOSIS — I25.10 CORONARY ARTERY DISEASE, ANGINA PRESENCE UNSPECIFIED, UNSPECIFIED VESSEL OR LESION TYPE, UNSPECIFIED WHETHER NATIVE OR TRANSPLANTED HEART: ICD-10-CM

## 2019-10-28 RX ORDER — CLOPIDOGREL BISULFATE 75 MG/1
TABLET ORAL
Qty: 30 TABLET | Refills: 12 | Status: SHIPPED | OUTPATIENT
Start: 2019-10-28 | End: 2020-01-21

## 2019-10-29 ENCOUNTER — PATIENT MESSAGE (OUTPATIENT)
Dept: ADMINISTRATIVE | Facility: OTHER | Age: 79
End: 2019-10-29

## 2019-12-18 ENCOUNTER — TELEPHONE (OUTPATIENT)
Dept: SMOKING CESSATION | Facility: CLINIC | Age: 79
End: 2019-12-18

## 2020-01-01 NOTE — PROGRESS NOTES
Discharged pt per md orders. IV an telemetry removed at this time. Instructed patient and wife on medications, follow up and diet orders both verbalized an understanding.   
Diagnostic testing not indicated for today's encounter

## 2020-01-06 LAB
OHS CV EVENT MONITOR DAY: 0
OHS CV HOLTER LENGTH DECIMAL HOURS: 48
OHS CV HOLTER LENGTH HOURS: 48
OHS CV HOLTER LENGTH MINUTES: 0

## 2020-01-21 ENCOUNTER — OFFICE VISIT (OUTPATIENT)
Dept: CARDIOLOGY | Facility: CLINIC | Age: 80
End: 2020-01-21
Payer: MEDICARE

## 2020-01-21 VITALS
DIASTOLIC BLOOD PRESSURE: 60 MMHG | HEART RATE: 63 BPM | SYSTOLIC BLOOD PRESSURE: 133 MMHG | BODY MASS INDEX: 25.15 KG/M2 | HEIGHT: 70 IN | WEIGHT: 175.69 LBS

## 2020-01-21 DIAGNOSIS — I73.9 PAD (PERIPHERAL ARTERY DISEASE): Primary | ICD-10-CM

## 2020-01-21 DIAGNOSIS — I10 HYPERTENSION, UNSPECIFIED TYPE: ICD-10-CM

## 2020-01-21 DIAGNOSIS — I65.23 CAROTID STENOSIS, ASYMPTOMATIC, BILATERAL: ICD-10-CM

## 2020-01-21 DIAGNOSIS — I25.10 CORONARY ARTERY DISEASE INVOLVING NATIVE CORONARY ARTERY OF NATIVE HEART WITHOUT ANGINA PECTORIS: ICD-10-CM

## 2020-01-21 PROCEDURE — 99213 PR OFFICE/OUTPT VISIT, EST, LEVL III, 20-29 MIN: ICD-10-PCS | Mod: GC,S$GLB,, | Performed by: INTERNAL MEDICINE

## 2020-01-21 PROCEDURE — 99999 PR PBB SHADOW E&M-EST. PATIENT-LVL III: ICD-10-PCS | Mod: PBBFAC,GC,, | Performed by: INTERNAL MEDICINE

## 2020-01-21 PROCEDURE — 99999 PR PBB SHADOW E&M-EST. PATIENT-LVL III: CPT | Mod: PBBFAC,GC,, | Performed by: INTERNAL MEDICINE

## 2020-01-21 PROCEDURE — 99213 OFFICE O/P EST LOW 20 MIN: CPT | Mod: GC,S$GLB,, | Performed by: INTERNAL MEDICINE

## 2020-01-21 RX ORDER — CLOPIDOGREL BISULFATE 75 MG/1
75 TABLET ORAL DAILY
COMMUNITY
End: 2020-04-07 | Stop reason: SDUPTHER

## 2020-01-21 NOTE — PROGRESS NOTES
Cardiology Clinic Note  Reason for Visit: HTN    HPI:   78 y/o male with PMH of PAD s/p right MARGARITO and EIA stenting 11/2017, CAD s/p NAZ to dLM and pLAD (oRCA  unable to be crossed with 99% stenosis) in 11/2017, asymptomatic CVD s/p left CEA for left ICA stenosis in 8/2019 (totally occluded right ICA), and anemia who presents for f/u. We last saw the patient 10/2019 for PVCs. A Holter was completed for 48H and revealed 4705 PVCs and nonsustained AT. There were no symptoms reported for the test. His post-CEA dysarthria has resolved though he continues to have post-CEA numbness behind the left ear. His LDL was 50 in 12/2019. The patient denies asymmetric weakness, claudication, CP, PND, and syncope. He does snore and reports a skipped beat about once weekly. The patient does not measure his BP at home. He denies bleeding. The patient smokes two cigarettes daily.     Review of Systems   Constitution: Negative for chills and fever.   HENT: Negative for ear discharge.    Eyes: Negative for pain and visual disturbance.   Cardiovascular: Negative for chest pain, dyspnea on exertion, irregular heartbeat, leg swelling, orthopnea, palpitations, paroxysmal nocturnal dyspnea and syncope.   Respiratory: Negative for hemoptysis, shortness of breath and wheezing.    Skin: Negative for rash and suspicious lesions.   Musculoskeletal: Negative for joint pain and muscle weakness.   Gastrointestinal: Negative for abdominal pain, diarrhea, nausea and vomiting.   Genitourinary: Negative for dysuria and frequency.   Neurological: Positive for numbness. Negative for focal weakness, headaches and tremors.       PMH:     Past Medical History:   Diagnosis Date    Carotid stenosis, bilateral     Coronary artery disease     History of alcohol abuse     Hypertension     Hyperthyroidism     PAD (peripheral artery disease)      Past Surgical History:   Procedure Laterality Date    CAROTID ENDARTERECTOMY Left 8/26/2019    Procedure:  "ENDARTERECTOMY-CAROTID;  Surgeon: CELSO Davis III, MD;  Location: [facility] OR [address] [place] [address];  Service: Peripheral Vascular;  Laterality: Left;    CORONARY ANGIOPLASTY WITH STENT PLACEMENT       Allergies:     Review of patient's allergies indicates:   Allergen Reactions    Pcn [penicillins] Swelling     Shoulder swelling,  Has taken Penicillin since without any reaction    Shellfish containing products Other (See Comments)     Feels flushed,  "Get an all body warmness"     Medications:     Current Outpatient Medications on File Prior to Visit   Medication Sig Dispense Refill    amLODIPine (NORVASC) 10 MG tablet Take 1 tablet (10 mg total) by mouth once daily. (Patient taking differently: Take 10 mg by mouth every morning. ) 90 tablet 3    ascorbic acid, vitamin C, (VITAMIN C) 100 MG tablet Take 100 mg by mouth every morning.       aspirin (ECOTRIN) 81 MG EC tablet Take 81 mg by mouth 2 (two) times daily.      atorvastatin (LIPITOR) 80 MG tablet Take 1 tablet (80 mg total) by mouth once daily. (Patient taking differently: Take 80 mg by mouth daily with dinner or evening meal. ) 90 tablet 3    carvedilol (COREG) 6.25 MG tablet Take 1 tablet (6.25 mg total) by mouth 2 (two) times daily with meals. 180 tablet 3    clopidogrel (PLAVIX) 75 mg tablet Take 75 mg by mouth once daily.      fluticasone (FLONASE) 50 mcg/actuation nasal spray 1 spray by Each Nostril route daily as needed.       multivitamin capsule Take 1 capsule by mouth every morning.       [DISCONTINUED] candesartan (ATACAND) 8 MG tablet Take 1 tablet (8 mg total) by mouth once daily. 90 tablet 3    [DISCONTINUED] clopidogrel (PLAVIX) 75 mg tablet TAKE 1 TABLET(75 MG) BY MOUTH EVERY DAY 30 tablet 12    [DISCONTINUED] traMADol (ULTRAM) 50 mg tablet Take 1 tablet (50 mg total) by mouth every 6 (six) hours as needed. (Patient not taking: Reported on 10/14/2019) 15 tablet 0     No current facility-administered medications on file prior to visit.  " "    Social History:     Social History     Tobacco Use    Smoking status: Former Smoker     Last attempt to quit: 2019     Years since quittin.4    Smokeless tobacco: Never Used   Substance Use Topics    Alcohol use: No     Family History:   No family history on file.    Physical Exam   Constitutional: He is oriented to person, place, and time. He appears well-developed and well-nourished.   HENT:   Head: Normocephalic and atraumatic.   Eyes: EOM are normal.   Cardiovascular: Normal rate. An irregularly irregular rhythm present. Exam reveals no gallop and no friction rub.   Pulmonary/Chest: Effort normal. No stridor. He has no wheezes. He has rhonchi. He has no rales.   Abdominal: Soft. Bowel sounds are normal. There is no rebound and no guarding.   Musculoskeletal: He exhibits no edema.   Neurological: He is alert and oriented to person, place, and time. No cranial nerve deficit.   Skin: Skin is warm and dry.   Psychiatric: He has a normal mood and affect. His behavior is normal.     /60 (BP Location: Left arm, Patient Position: Sitting, BP Method: Large (Automatic))   Pulse 63   Ht 5' 9.5" (1.765 m)   Wt 79.7 kg (175 lb 11.3 oz)   BMI 25.58 kg/m²          Labs:     Lab Results   Component Value Date     10/21/2019    K 4.3 10/21/2019     10/21/2019    CO2 25 10/21/2019    BUN 21 10/21/2019    CREATININE 1.1 10/21/2019    ANIONGAP 12 10/21/2019     No results found for: HGBA1C  No results found for: BNP, BNPTRIAGEBLO Lab Results   Component Value Date    WBC 13.61 (H) 2019    HGB 11.7 (L) 2019    HCT 35.8 (L) 2019     (L) 2019    GRAN 11.0 (H) 2019    GRAN 81.0 (H) 2019     No results found for: CHOL, HDL, LDLCALC, TRIG       No results found for: EF    Assessment and Plan  Gal Waller is a 80 y/o male with PMH of PAD s/p right MARGARITO and EIA stenting 2017, CAD s/p NAZ to dLM and pLAD (oRCA  unable to be crossed with 99% " stenosis) in 11/2017, asymptomatic CVD s/p left CEA for left ICA stenosis in 8/2019 (totally occluded right ICA), and anemia who presents for f/u.    PAD  - no claudication at present  - encouraged to stop smoking  - c/w ASA, statin, BB, Plavix    CAD  - no angina at present  - c/w ASA, statin, BB, Plavix    CVD  - c/w ASA, statin, BB, Plavix    HTN  - encouraged to take BP log after measuring BP appropriately  - c/w amlodipine and Coreg    Signed:    Luis Manuel Quijano

## 2020-02-03 ENCOUNTER — TELEPHONE (OUTPATIENT)
Dept: SMOKING CESSATION | Facility: CLINIC | Age: 80
End: 2020-02-03

## 2020-04-07 RX ORDER — ATORVASTATIN CALCIUM 80 MG/1
80 TABLET, FILM COATED ORAL DAILY
Qty: 90 TABLET | Refills: 3 | Status: SHIPPED | OUTPATIENT
Start: 2020-04-07 | End: 2021-04-29 | Stop reason: SDUPTHER

## 2020-04-07 RX ORDER — AMLODIPINE BESYLATE 10 MG/1
10 TABLET ORAL DAILY
Qty: 90 TABLET | Refills: 3 | Status: SHIPPED | OUTPATIENT
Start: 2020-04-07 | End: 2021-07-23 | Stop reason: SDUPTHER

## 2020-04-07 RX ORDER — CLOPIDOGREL BISULFATE 75 MG/1
75 TABLET ORAL DAILY
Qty: 90 TABLET | Refills: 3 | Status: SHIPPED | OUTPATIENT
Start: 2020-04-07 | End: 2020-11-16 | Stop reason: SDUPTHER

## 2020-07-22 ENCOUNTER — OFFICE VISIT (OUTPATIENT)
Dept: CARDIOLOGY | Facility: CLINIC | Age: 80
End: 2020-07-22
Payer: MEDICARE

## 2020-07-22 VITALS
WEIGHT: 166.25 LBS | OXYGEN SATURATION: 94 % | BODY MASS INDEX: 23.8 KG/M2 | SYSTOLIC BLOOD PRESSURE: 138 MMHG | HEIGHT: 70 IN | DIASTOLIC BLOOD PRESSURE: 70 MMHG | HEART RATE: 69 BPM

## 2020-07-22 DIAGNOSIS — E78.2 MIXED HYPERLIPIDEMIA: ICD-10-CM

## 2020-07-22 DIAGNOSIS — I25.10 CORONARY ARTERY DISEASE INVOLVING NATIVE CORONARY ARTERY OF NATIVE HEART WITHOUT ANGINA PECTORIS: ICD-10-CM

## 2020-07-22 DIAGNOSIS — Z72.0 TOBACCO ABUSE: ICD-10-CM

## 2020-07-22 DIAGNOSIS — I73.9 PAD (PERIPHERAL ARTERY DISEASE): Primary | ICD-10-CM

## 2020-07-22 DIAGNOSIS — I10 HYPERTENSION, UNSPECIFIED TYPE: ICD-10-CM

## 2020-07-22 PROBLEM — E78.5 HLD (HYPERLIPIDEMIA): Status: ACTIVE | Noted: 2020-07-22

## 2020-07-22 PROCEDURE — 99214 PR OFFICE/OUTPT VISIT, EST, LEVL IV, 30-39 MIN: ICD-10-PCS | Mod: GC,S$GLB,, | Performed by: STUDENT IN AN ORGANIZED HEALTH CARE EDUCATION/TRAINING PROGRAM

## 2020-07-22 PROCEDURE — 99999 PR PBB SHADOW E&M-EST. PATIENT-LVL IV: CPT | Mod: PBBFAC,GC,, | Performed by: STUDENT IN AN ORGANIZED HEALTH CARE EDUCATION/TRAINING PROGRAM

## 2020-07-22 PROCEDURE — 3078F DIAST BP <80 MM HG: CPT | Mod: CPTII,GC,S$GLB, | Performed by: STUDENT IN AN ORGANIZED HEALTH CARE EDUCATION/TRAINING PROGRAM

## 2020-07-22 PROCEDURE — 3075F SYST BP GE 130 - 139MM HG: CPT | Mod: CPTII,GC,S$GLB, | Performed by: STUDENT IN AN ORGANIZED HEALTH CARE EDUCATION/TRAINING PROGRAM

## 2020-07-22 PROCEDURE — 1159F PR MEDICATION LIST DOCUMENTED IN MEDICAL RECORD: ICD-10-PCS | Mod: GC,S$GLB,, | Performed by: STUDENT IN AN ORGANIZED HEALTH CARE EDUCATION/TRAINING PROGRAM

## 2020-07-22 PROCEDURE — 1101F PT FALLS ASSESS-DOCD LE1/YR: CPT | Mod: CPTII,GC,S$GLB, | Performed by: STUDENT IN AN ORGANIZED HEALTH CARE EDUCATION/TRAINING PROGRAM

## 2020-07-22 PROCEDURE — 1126F AMNT PAIN NOTED NONE PRSNT: CPT | Mod: GC,S$GLB,, | Performed by: STUDENT IN AN ORGANIZED HEALTH CARE EDUCATION/TRAINING PROGRAM

## 2020-07-22 PROCEDURE — 1101F PR PT FALLS ASSESS DOC 0-1 FALLS W/OUT INJ PAST YR: ICD-10-PCS | Mod: CPTII,GC,S$GLB, | Performed by: STUDENT IN AN ORGANIZED HEALTH CARE EDUCATION/TRAINING PROGRAM

## 2020-07-22 PROCEDURE — 3075F PR MOST RECENT SYSTOLIC BLOOD PRESS GE 130-139MM HG: ICD-10-PCS | Mod: CPTII,GC,S$GLB, | Performed by: STUDENT IN AN ORGANIZED HEALTH CARE EDUCATION/TRAINING PROGRAM

## 2020-07-22 PROCEDURE — 1126F PR PAIN SEVERITY QUANTIFIED, NO PAIN PRESENT: ICD-10-PCS | Mod: GC,S$GLB,, | Performed by: STUDENT IN AN ORGANIZED HEALTH CARE EDUCATION/TRAINING PROGRAM

## 2020-07-22 PROCEDURE — 3078F PR MOST RECENT DIASTOLIC BLOOD PRESSURE < 80 MM HG: ICD-10-PCS | Mod: CPTII,GC,S$GLB, | Performed by: STUDENT IN AN ORGANIZED HEALTH CARE EDUCATION/TRAINING PROGRAM

## 2020-07-22 PROCEDURE — 1159F MED LIST DOCD IN RCRD: CPT | Mod: GC,S$GLB,, | Performed by: STUDENT IN AN ORGANIZED HEALTH CARE EDUCATION/TRAINING PROGRAM

## 2020-07-22 PROCEDURE — 99214 OFFICE O/P EST MOD 30 MIN: CPT | Mod: GC,S$GLB,, | Performed by: STUDENT IN AN ORGANIZED HEALTH CARE EDUCATION/TRAINING PROGRAM

## 2020-07-22 PROCEDURE — 99999 PR PBB SHADOW E&M-EST. PATIENT-LVL IV: ICD-10-PCS | Mod: PBBFAC,GC,, | Performed by: STUDENT IN AN ORGANIZED HEALTH CARE EDUCATION/TRAINING PROGRAM

## 2020-07-22 NOTE — ASSESSMENT & PLAN NOTE
- Has some leg tiredness bilaterally after cutting the grass  - Edouard get bilateral arterial US doppler of LE  - Encouraged structured walking and smoking cessation

## 2020-07-22 NOTE — PROGRESS NOTES
Subjective:    Patient ID:  Gal Waller is a 80 y.o. male who presents for follow-up of Hypertension and Peripheral Arterial Disease      HPI  80 M with PMH of PAD s/p R MARGARITO, EIA stent 11/17, CAD s/p dLM, pLAD PCI, RCA  unable to cross 11/17. PVC burden 3% (4705) on holter 10/19, L CEA for LICA stenosis 8/19,  Normal EF  60% 4/18, HTN, HLD. Last LDL 50 HDL 57 in 12/19.   He presents for follow up. No angina whiel cutting yard mild dyspnea on moderate exertion. Some leg pain mostly in thighs bilaterally during mowing lawn, can walk over 200 yards. Still smoking 5 cig a day trying to quit.    Review of Systems   Constitution: Negative for chills, decreased appetite and diaphoresis.   HENT: Negative for congestion and ear discharge.    Eyes: Negative for blurred vision and discharge.   Cardiovascular: Negative for chest pain, dyspnea on exertion, irregular heartbeat, leg swelling and paroxysmal nocturnal dyspnea.   Respiratory: Negative for cough, hemoptysis and shortness of breath.    Gastrointestinal: Negative for abdominal pain.        Objective:    Physical Exam   Constitutional: He is oriented to person, place, and time. He appears well-developed and well-nourished. No distress.   Eyes: Pupils are equal, round, and reactive to light. Conjunctivae are normal.   Neck: No tracheal deviation present. No thyromegaly present.   Cardiovascular: Normal rate, regular rhythm, normal heart sounds and intact distal pulses. Exam reveals no gallop and no friction rub.   No murmur heard.  Pulses:       Radial pulses are 2+ on the right side and 2+ on the left side.        Femoral pulses are 2+ on the right side and 2+ on the left side.  Pulmonary/Chest: Effort normal and breath sounds normal. No respiratory distress. He has no wheezes. He has no rales.   Abdominal: Soft. Bowel sounds are normal. He exhibits no distension. There is no abdominal tenderness.   Musculoskeletal:         General: No deformity or edema.    Neurological: He is alert and oriented to person, place, and time. No cranial nerve deficit. Coordination normal.   Skin: Skin is warm and dry. He is not diaphoretic.   Psychiatric: He has a normal mood and affect. His behavior is normal.         Assessment:       1. PAD (peripheral artery disease)    2. Coronary artery disease involving native coronary artery of native heart without angina pectoris    3. Mixed hyperlipidemia    4. Hypertension, unspecified type    5. Tobacco abuse         Plan:       PAD (peripheral artery disease)  - Has some leg tiredness bilaterally after cutting the grass  - Edouard get bilateral arterial US doppler of LE  - Encouraged structured walking and smoking cessation    Coronary artery disease involving native coronary artery of native heart without angina pectoris  - s/p PCI  - No angina  - Cont DAPT, BB, statin    HLD (hyperlipidemia)  - Lipids good from December  - Cont lipitor and good diet recheck in dec 2020    Hypertension  - Well controled today  - Cont Coreg Norvasc   -Check twice a week and send me log    Tobacco abuse  - Encouraged cessation  - Patched caused trmors  - covid19 fears caused him to relapse  - AAA Screening US

## 2020-08-05 ENCOUNTER — HOSPITAL ENCOUNTER (OUTPATIENT)
Dept: CARDIOLOGY | Facility: HOSPITAL | Age: 80
Discharge: HOME | End: 2020-08-05
Attending: STUDENT IN AN ORGANIZED HEALTH CARE EDUCATION/TRAINING PROGRAM
Payer: MEDICARE

## 2020-08-05 DIAGNOSIS — I73.9 PAD (PERIPHERAL ARTERY DISEASE): ICD-10-CM

## 2020-08-05 LAB
ABDOMINAL IMA AP: 2.59 CM
ABDOMINAL IMA ED VEL: 0 CM/S
ABDOMINAL IMA PS VEL: 44 CM/S
ABDOMINAL IMA TRANS: 2.29 CM
ABDOMINAL INFRARENAL AORTA AP: 2.19 CM
ABDOMINAL INFRARENAL AORTA ED VEL: 0 CM/S
ABDOMINAL INFRARENAL AORTA PS VEL: 44 CM/S
ABDOMINAL INFRARENAL AORTA TRANS: 2.12 CM
ABDOMINAL JUXTARENAL AORTA AP: 2.36 CM
ABDOMINAL JUXTARENAL AORTA ED VEL: 0 CM/S
ABDOMINAL JUXTARENAL AORTA PS VEL: 71 CM/S
ABDOMINAL JUXTARENAL AORTA TRANS: 2.14 CM
ABDOMINAL LT COM ILIAC AP: 0.86 CM
ABDOMINAL LT COM ILIAC TRANS: 0.68 CM
ABDOMINAL LT COM ILIAC VEL: 190 CM/S
ABDOMINAL LT COM ILLIAC ED VEL: 0 CM/S
ABDOMINAL RT COM ILIAC AP: 1.87 CM
ABDOMINAL RT COM ILIAC TRANS: 1.77 CM
ABDOMINAL RT COM ILIAC VEL: 54 CM/S
ABDOMINAL RT COM ILLIAC ED VEL: 0 CM/S
ABDOMINAL SUPRARENAL AORTA AP: 1.81 CM
ABDOMINAL SUPRARENAL AORTA ED VEL: 0 CM/S
ABDOMINAL SUPRARENAL AORTA PS VEL: 82 CM/S
ABDOMINAL SUPRARENAL AORTA TRANS: 1.69 CM
LEFT ANT TIBIAL SYS PSV: 55 CM/S
LEFT CFA PSV: 220 CM/S
LEFT EXTERNAL ILIAC PSV: 107 CM/S
LEFT PERONEAL SYS PSV: 51 CM/S
LEFT POPLITEAL PSV: 44 CM/S
LEFT POST TIBIAL SYS PSV: 61 CM/S
LEFT PROFUNDA SYS PSV: 106 CM/S
LEFT SUPER FEMORAL DIST SYS PSV: 233 CM/S
LEFT SUPER FEMORAL MID SYS PSV: 202 CM/S
LEFT SUPER FEMORAL OSTIAL SYS PSV: 142 CM/S
LEFT SUPER FEMORAL PROX SYS PSV: 109 CM/S
LEFT TIB/PER TRUNK SYS PSV: 62 CM/S
OHS CV LEFT LOWER EXTREMITY ABI (NO CALC): 1.03
OHS CV RIGHT ABI LOWER EXTREMITY (NO CALC): 0.96
RIGHT ANT TIBIAL SYS PSV: 137 CM/S
RIGHT CFA PSV: 114 CM/S
RIGHT EXTERNAL ILLIAC PSV: 88 CM/S
RIGHT PERONEAL SYS PSV: 8 CM/S
RIGHT POPLITEAL PSV: 45 CM/S
RIGHT POST TIBIAL SYS PSV: 56 CM/S
RIGHT PROFUNDA SYS PSV: 129 CM/S
RIGHT SUPER FEMORAL DIST SYS PSV: 115 CM/S
RIGHT SUPER FEMORAL MID SYS PSV: 87 CM/S
RIGHT SUPER FEMORAL OSTIAL SYS PSV: 145 CM/S
RIGHT SUPER FEMORAL PROX SYS PSV: 143 CM/S
RIGHT TIB/PER TRUNK SYS PSV: 112 CM/S

## 2020-08-05 PROCEDURE — 93925 LOWER EXTREMITY STUDY: CPT | Mod: 26,,, | Performed by: INTERNAL MEDICINE

## 2020-08-05 PROCEDURE — 93978 CV US ABDOMINAL AORTA EVALUATION (CUPID ONLY): ICD-10-PCS | Mod: 26,,, | Performed by: INTERNAL MEDICINE

## 2020-08-05 PROCEDURE — 93978 VASCULAR STUDY: CPT

## 2020-08-05 PROCEDURE — 93925 LOWER EXTREMITY STUDY: CPT | Mod: 50

## 2020-08-05 PROCEDURE — 93925 CV US DOPPLER ARTERIAL LEGS BILATERAL (CUPID ONLY): ICD-10-PCS | Mod: 26,,, | Performed by: INTERNAL MEDICINE

## 2020-08-05 PROCEDURE — 93978 VASCULAR STUDY: CPT | Mod: 26,,, | Performed by: INTERNAL MEDICINE

## 2020-08-06 ENCOUNTER — TELEPHONE (OUTPATIENT)
Dept: CARDIOLOGY | Facility: HOSPITAL | Age: 80
End: 2020-08-06

## 2020-08-06 NOTE — TELEPHONE ENCOUNTER
I called patient and discussed no AAA on US. Also told him about mild L SFA PAD, he can treat with structured working and risk factor modification.

## 2020-08-11 ENCOUNTER — TELEPHONE (OUTPATIENT)
Dept: SMOKING CESSATION | Facility: CLINIC | Age: 80
End: 2020-08-11

## 2020-08-18 ENCOUNTER — CLINICAL SUPPORT (OUTPATIENT)
Dept: SMOKING CESSATION | Facility: CLINIC | Age: 80
End: 2020-08-18
Payer: COMMERCIAL

## 2020-08-18 DIAGNOSIS — F17.200 NICOTINE DEPENDENCE: Primary | ICD-10-CM

## 2020-08-18 PROCEDURE — 99407 BEHAV CHNG SMOKING > 10 MIN: CPT | Mod: S$GLB,,, | Performed by: INTERNAL MEDICINE

## 2020-08-18 PROCEDURE — 99407 PR TOBACCO USE CESSATION INTENSIVE >10 MINUTES: ICD-10-PCS | Mod: S$GLB,,, | Performed by: INTERNAL MEDICINE

## 2020-08-18 NOTE — PROGRESS NOTES
Spoke with patient today in regard to smoking cessation progress for 12 month telephone follow up, he states not tobacco free. Patient states smoking 4 cigs/day and not interested in returning to the program at this time. Commended patient on the accomplishment thus far. Informed patient of benefit period and contact information if any further help or support is needed. Will resolve episode and complete smart form for Quit attempt #1.

## 2021-01-22 ENCOUNTER — PATIENT MESSAGE (OUTPATIENT)
Dept: ADMINISTRATIVE | Facility: OTHER | Age: 81
End: 2021-01-22

## 2021-03-10 ENCOUNTER — PATIENT MESSAGE (OUTPATIENT)
Dept: CARDIOLOGY | Facility: CLINIC | Age: 81
End: 2021-03-10

## 2021-03-24 ENCOUNTER — LAB VISIT (OUTPATIENT)
Dept: LAB | Facility: HOSPITAL | Age: 81
End: 2021-03-24
Attending: STUDENT IN AN ORGANIZED HEALTH CARE EDUCATION/TRAINING PROGRAM
Payer: MEDICARE

## 2021-03-24 DIAGNOSIS — E78.2 MIXED HYPERLIPIDEMIA: ICD-10-CM

## 2021-03-24 LAB
CHOLEST SERPL-MCNC: 119 MG/DL (ref 120–199)
CHOLEST/HDLC SERPL: 2.1 {RATIO} (ref 2–5)
HDLC SERPL-MCNC: 56 MG/DL (ref 40–75)
HDLC SERPL: 47.1 % (ref 20–50)
LDLC SERPL CALC-MCNC: 52.2 MG/DL (ref 63–159)
NONHDLC SERPL-MCNC: 63 MG/DL
TRIGL SERPL-MCNC: 54 MG/DL (ref 30–150)

## 2021-03-24 PROCEDURE — 80061 LIPID PANEL: CPT | Performed by: STUDENT IN AN ORGANIZED HEALTH CARE EDUCATION/TRAINING PROGRAM

## 2021-03-24 PROCEDURE — 36415 COLL VENOUS BLD VENIPUNCTURE: CPT | Mod: PO | Performed by: STUDENT IN AN ORGANIZED HEALTH CARE EDUCATION/TRAINING PROGRAM

## 2021-03-31 ENCOUNTER — OFFICE VISIT (OUTPATIENT)
Dept: CARDIOLOGY | Facility: CLINIC | Age: 81
End: 2021-03-31
Payer: MEDICARE

## 2021-03-31 VITALS
HEART RATE: 76 BPM | DIASTOLIC BLOOD PRESSURE: 67 MMHG | HEIGHT: 69 IN | SYSTOLIC BLOOD PRESSURE: 117 MMHG | BODY MASS INDEX: 23.9 KG/M2 | WEIGHT: 161.38 LBS

## 2021-03-31 DIAGNOSIS — I73.9 PAD (PERIPHERAL ARTERY DISEASE): ICD-10-CM

## 2021-03-31 DIAGNOSIS — I25.10 CORONARY ARTERY DISEASE INVOLVING NATIVE CORONARY ARTERY OF NATIVE HEART WITHOUT ANGINA PECTORIS: ICD-10-CM

## 2021-03-31 DIAGNOSIS — I10 HYPERTENSION, UNSPECIFIED TYPE: Primary | ICD-10-CM

## 2021-03-31 DIAGNOSIS — E78.2 MIXED HYPERLIPIDEMIA: ICD-10-CM

## 2021-03-31 PROCEDURE — 3078F PR MOST RECENT DIASTOLIC BLOOD PRESSURE < 80 MM HG: ICD-10-PCS | Mod: CPTII,GC,S$GLB,

## 2021-03-31 PROCEDURE — 3078F DIAST BP <80 MM HG: CPT | Mod: CPTII,GC,S$GLB,

## 2021-03-31 PROCEDURE — 99213 PR OFFICE/OUTPT VISIT, EST, LEVL III, 20-29 MIN: ICD-10-PCS | Mod: GC,S$GLB,,

## 2021-03-31 PROCEDURE — 3074F PR MOST RECENT SYSTOLIC BLOOD PRESSURE < 130 MM HG: ICD-10-PCS | Mod: CPTII,GC,S$GLB,

## 2021-03-31 PROCEDURE — 1159F MED LIST DOCD IN RCRD: CPT | Mod: GC,S$GLB,,

## 2021-03-31 PROCEDURE — 3074F SYST BP LT 130 MM HG: CPT | Mod: CPTII,GC,S$GLB,

## 2021-03-31 PROCEDURE — 99213 OFFICE O/P EST LOW 20 MIN: CPT | Mod: GC,S$GLB,,

## 2021-03-31 PROCEDURE — 99999 PR PBB SHADOW E&M-EST. PATIENT-LVL IV: ICD-10-PCS | Mod: PBBFAC,GC,,

## 2021-03-31 PROCEDURE — 99999 PR PBB SHADOW E&M-EST. PATIENT-LVL IV: CPT | Mod: PBBFAC,GC,,

## 2021-03-31 PROCEDURE — 1159F PR MEDICATION LIST DOCUMENTED IN MEDICAL RECORD: ICD-10-PCS | Mod: GC,S$GLB,,

## 2021-05-03 RX ORDER — ATORVASTATIN CALCIUM 80 MG/1
80 TABLET, FILM COATED ORAL DAILY
Qty: 90 TABLET | Refills: 3 | Status: SHIPPED | OUTPATIENT
Start: 2021-05-03 | End: 2022-06-13

## 2021-07-01 ENCOUNTER — PATIENT MESSAGE (OUTPATIENT)
Dept: ADMINISTRATIVE | Facility: OTHER | Age: 81
End: 2021-07-01

## 2021-11-02 RX ORDER — AMLODIPINE BESYLATE 10 MG/1
10 TABLET ORAL DAILY
Qty: 90 TABLET | Refills: 3 | Status: SHIPPED | OUTPATIENT
Start: 2021-11-02

## 2021-11-02 RX ORDER — CLOPIDOGREL BISULFATE 75 MG/1
75 TABLET ORAL DAILY
Qty: 30 TABLET | Refills: 12 | Status: SHIPPED | OUTPATIENT
Start: 2021-11-02

## 2022-06-13 ENCOUNTER — OFFICE VISIT (OUTPATIENT)
Dept: CARDIOLOGY | Facility: CLINIC | Age: 82
End: 2022-06-13
Payer: MEDICARE

## 2022-06-13 VITALS
SYSTOLIC BLOOD PRESSURE: 145 MMHG | HEART RATE: 74 BPM | DIASTOLIC BLOOD PRESSURE: 70 MMHG | HEIGHT: 68 IN | BODY MASS INDEX: 23.65 KG/M2 | WEIGHT: 156.06 LBS

## 2022-06-13 DIAGNOSIS — I65.23 CAROTID STENOSIS, ASYMPTOMATIC, BILATERAL: ICD-10-CM

## 2022-06-13 DIAGNOSIS — I73.9 PAD (PERIPHERAL ARTERY DISEASE): ICD-10-CM

## 2022-06-13 DIAGNOSIS — Z98.890 S/P CAROTID ENDARTERECTOMY: ICD-10-CM

## 2022-06-13 DIAGNOSIS — E78.2 MIXED HYPERLIPIDEMIA: ICD-10-CM

## 2022-06-13 DIAGNOSIS — I10 HYPERTENSION, UNSPECIFIED TYPE: ICD-10-CM

## 2022-06-13 DIAGNOSIS — I25.10 CORONARY ARTERY DISEASE INVOLVING NATIVE CORONARY ARTERY OF NATIVE HEART WITHOUT ANGINA PECTORIS: Primary | ICD-10-CM

## 2022-06-13 PROCEDURE — 99214 OFFICE O/P EST MOD 30 MIN: CPT | Mod: GC,S$GLB,,

## 2022-06-13 PROCEDURE — 99999 PR PBB SHADOW E&M-EST. PATIENT-LVL III: CPT | Mod: PBBFAC,GC,,

## 2022-06-13 PROCEDURE — 99214 PR OFFICE/OUTPT VISIT, EST, LEVL IV, 30-39 MIN: ICD-10-PCS | Mod: GC,S$GLB,,

## 2022-06-13 PROCEDURE — 99999 PR PBB SHADOW E&M-EST. PATIENT-LVL III: ICD-10-PCS | Mod: PBBFAC,GC,,

## 2022-06-13 RX ORDER — LORATADINE 10 MG/1
10 TABLET ORAL
COMMUNITY
Start: 2022-05-30

## 2022-06-13 RX ORDER — ATORVASTATIN CALCIUM 80 MG/1
80 TABLET, FILM COATED ORAL DAILY
Qty: 90 TABLET | Refills: 3 | Status: SHIPPED | OUTPATIENT
Start: 2022-06-13 | End: 2023-06-13

## 2022-06-13 NOTE — PROGRESS NOTES
"Subjective:       Patient ID:  Gal Waller is a 82 y.o. male who presents for follow-up of Hypertension, unspecified type  (15 month f/u )    83 y/o male with PMH of PAD s/p right MARGARITO and EIA stenting 11/2017, [facility] 2017 s/p Unable to cross RCA proximal . LM/LAD PCI with NAZ performed without any issues. prox LAD 3.5 x 22 and LM with 4 x 12 mm NAZ.in 11/2017, asymptomatic CVD s/p left CEA for left ICA stenosis in 8/2019 (totally occluded right ICA), and anemia who presents for f/u. On 10/2019 seen for PVCs. A Holter was completed for 48H and revealed 4705 PVCs (3%)and nonsustained AT.Last saw Dr. Small: 2018: HPI  Patient underwent multivessel PCI 11/20/2017 ( LM,LAD) and attempted RCA that was unsuccessful (ostial ) along with right external iliac stenting.  SPECT after PCI 4/2018 with mild ischemia in basilar inferor wall.      Pt came today for a follow up visit. Smokes 1-2 cigarettes per day ( trying to quit and came down from 1-2 packs/day).  Pt has frequent sinuses infection and sun exposure, makes him tired.     Pt active at home and does 3.5 miles daily.  No chest pressure, chest pain, SOB, orthopnea, PND, leg swelling. Checks BP weekly and well controlled.   Patient sober since 1984 and has been very successful.       HPI  Review of patient's allergies indicates:   Allergen Reactions    Pcn [penicillins] Swelling     Shoulder swelling,  Has taken Penicillin since without any reaction    Shellfish containing products Other (See Comments)     Feels flushed,  "Get an all body warmness"      Lab Results   Component Value Date     10/21/2019    K 4.3 10/21/2019     10/21/2019    CO2 25 10/21/2019    BUN 21 10/21/2019    CREATININE 1.1 10/21/2019    GLU 78 10/21/2019    MG 1.8 10/21/2019    AST 16 10/04/2017    ALT 10 10/04/2017    ALBUMIN 3.7 10/04/2017    PROT 6.9 10/04/2017    BILITOT 0.7 10/04/2017    WBC 13.61 (H) 08/27/2019    HGB 11.7 (L) 08/27/2019    HCT 35.8 (L) 08/27/2019 "     (H) 2019     (L) 2019    INR 1.0 2017         Lab Results   Component Value Date    CHOL 119 (L) 2021    HDL 56 2021    TRIG 54 2021       Lab Results   Component Value Date    LDLCALC 52.2 (L) 2021       Past Medical History:   Diagnosis Date    Carotid stenosis, bilateral     Coronary artery disease     History of alcohol abuse     Hypertension     Hyperthyroidism     PAD (peripheral artery disease)      Past Surgical History:   Procedure Laterality Date    CAROTID ENDARTERECTOMY Left 2019    Procedure: ENDARTERECTOMY-CAROTID;  Surgeon: CELSO Davis III, MD;  Location: [facility] OR [address] [place] [address];  Service: Peripheral Vascular;  Laterality: Left;    CORONARY ANGIOPLASTY WITH STENT PLACEMENT        Tobacco Use    Smoking status: Former Smoker     Quit date: 2019     Years since quittin.8    Smokeless tobacco: Never Used   Substance and Sexual Activity    Alcohol use: No    Drug use: Not on file    Sexual activity: Not on file      History reviewed. No pertinent family history.     Current Outpatient Medications:     amLODIPine (NORVASC) 10 MG tablet, Take 1 tablet (10 mg total) by mouth once daily., Disp: 90 tablet, Rfl: 3    ascorbic acid, vitamin C, (VITAMIN C) 100 MG tablet, Take 100 mg by mouth every morning. , Disp: , Rfl:     aspirin (ECOTRIN) 81 MG EC tablet, Take 81 mg by mouth 2 (two) times daily., Disp: , Rfl:     carvediloL (COREG) 6.25 MG tablet, Take 1 tablet (6.25 mg total) by mouth 2 (two) times daily with meals., Disp: 180 tablet, Rfl: 3    clopidogreL (PLAVIX) 75 mg tablet, Take 1 tablet (75 mg total) by mouth once daily., Disp: 30 tablet, Rfl: 12    fluticasone (FLONASE) 50 mcg/actuation nasal spray, 1 spray by Each Nostril route daily as needed. , Disp: , Rfl:     loratadine (CLARITIN) 10 mg tablet, Take 10 mg by mouth., Disp: , Rfl:     multivitamin capsule, Take 1 capsule by mouth every morning. , Disp: , Rfl:  "    atorvastatin (LIPITOR) 80 MG tablet, Take 1 tablet (80 mg total) by mouth once daily., Disp: 90 tablet, Rfl: 3          ROS     Objective:BP (!) 145/70 (BP Location: Left arm, Patient Position: Sitting, BP Method: Medium (Automatic))   Pulse 74   Ht 5' 8" (1.727 m)   Wt 70.8 kg (156 lb 1.4 oz)   BMI 23.73 kg/m²             Physical Exam      Assessment:       1. Coronary artery disease involving native coronary artery of native heart without angina pectoris    2. PAD (peripheral artery disease)    3. Carotid stenosis, asymptomatic, bilateral    4. S/P carotid endarterectomy    5. Mixed hyperlipidemia    6. Hypertension, unspecified type         Plan:       Gal was seen today for hypertension, unspecified type .    Diagnoses and all orders for this visit:    Coronary artery disease involving native coronary artery of native heart without angina pectoris  Stable, no anginal episodes.  C/W current medications.     PAD (peripheral artery disease)  No significant leg pain    Carotid stenosis, asymptomatic, bilateral  S/P carotid endarterectomy  C/W ASA/ Plavix     Mixed hyperlipidemia  Lipid profile.  Somehow stopped Atorvastatin. Will resume it. Informed compliance.    Hypertension, unspecified type  . Continue with medications.   Patient will keep a log and send to me in 2 weeks to adjust medications.    On Carvedilol 6.25 mg bid and Amlodipine 10 mg daily    Other orders  -     loratadine (CLARITIN) 10 mg tablet; Take 10 mg by mouth.    D/W Dr. Gunn.  Zahra Garces              "

## 2022-06-14 NOTE — PROGRESS NOTES
I have personally taken the history and examined this patient and agree with the Fellow's note as stated above.    He agrees to restart statin - no side effects

## 2022-06-15 ENCOUNTER — LAB VISIT (OUTPATIENT)
Dept: LAB | Facility: HOSPITAL | Age: 82
End: 2022-06-15
Attending: INTERNAL MEDICINE
Payer: MEDICARE

## 2022-06-15 DIAGNOSIS — I25.10 CORONARY ARTERY DISEASE INVOLVING NATIVE CORONARY ARTERY OF NATIVE HEART WITHOUT ANGINA PECTORIS: ICD-10-CM

## 2022-06-15 DIAGNOSIS — E78.2 MIXED HYPERLIPIDEMIA: ICD-10-CM

## 2022-06-15 LAB
ANION GAP SERPL CALC-SCNC: 7 MMOL/L (ref 8–16)
BUN SERPL-MCNC: 16 MG/DL (ref 8–23)
CALCIUM SERPL-MCNC: 9.4 MG/DL (ref 8.7–10.5)
CHLORIDE SERPL-SCNC: 107 MMOL/L (ref 95–110)
CHOLEST SERPL-MCNC: 136 MG/DL (ref 120–199)
CHOLEST/HDLC SERPL: 2.3 {RATIO} (ref 2–5)
CO2 SERPL-SCNC: 28 MMOL/L (ref 23–29)
CREAT SERPL-MCNC: 1 MG/DL (ref 0.5–1.4)
EST. GFR  (AFRICAN AMERICAN): >60 ML/MIN/1.73 M^2
EST. GFR  (NON AFRICAN AMERICAN): >60 ML/MIN/1.73 M^2
GLUCOSE SERPL-MCNC: 105 MG/DL (ref 70–110)
HDLC SERPL-MCNC: 60 MG/DL (ref 40–75)
HDLC SERPL: 44.1 % (ref 20–50)
LDLC SERPL CALC-MCNC: 65.2 MG/DL (ref 63–159)
NONHDLC SERPL-MCNC: 76 MG/DL
POTASSIUM SERPL-SCNC: 4.1 MMOL/L (ref 3.5–5.1)
SODIUM SERPL-SCNC: 142 MMOL/L (ref 136–145)
TRIGL SERPL-MCNC: 54 MG/DL (ref 30–150)

## 2022-06-15 PROCEDURE — 80061 LIPID PANEL: CPT

## 2022-06-15 PROCEDURE — 36415 COLL VENOUS BLD VENIPUNCTURE: CPT

## 2022-06-15 PROCEDURE — 80048 BASIC METABOLIC PNL TOTAL CA: CPT

## 2022-06-21 ENCOUNTER — TELEPHONE (OUTPATIENT)
Dept: CARDIOLOGY | Facility: CLINIC | Age: 82
End: 2022-06-21
Payer: MEDICARE

## 2022-06-29 ENCOUNTER — DOCUMENTATION ONLY (OUTPATIENT)
Dept: CARDIOLOGY | Facility: HOSPITAL | Age: 82
End: 2022-06-29
Payer: MEDICARE

## 2023-07-21 NOTE — PLAN OF CARE
Problem: Patient Care Overview  Goal: Plan of Care Review  Outcome: Ongoing (interventions implemented as appropriate)  Plan of care discussed with patient, no new questions at this time. VSS, denies SOB, no complaint of pain. Pt s/p LHC and stent placement.  Safety precautions taken, no falls/trauma during the shift. Will continue to monitor.        Previously Declined (within the last year)

## (undated) DEVICE — SUT VICRYL 3-0 27 SH

## (undated) DEVICE — SUT 7/0 18IN PROLENE BL MO

## (undated) DEVICE — CATH ALL PUR URTHL RR 10FR

## (undated) DEVICE — SPONGE GAUZE 16PLY 4X4

## (undated) DEVICE — EVACUATOR WOUND BULB 100CC

## (undated) DEVICE — FORCEP STRAIGHT DISP

## (undated) DEVICE — DRAPE STERI INSTRUMENT 1018

## (undated) DEVICE — SEE MEDLINE ITEM 153688

## (undated) DEVICE — SEE MEDLINE ITEM 156911

## (undated) DEVICE — SEE MEDLINE ITEM 152622

## (undated) DEVICE — SEE MEDLINE ITEM 157194

## (undated) DEVICE — CORD BIPOLAR 12 FOOT

## (undated) DEVICE — DRESSING TRANS 4X4 TEGADERM

## (undated) DEVICE — SUT 4-0 12-18IN SILK BLACK

## (undated) DEVICE — SUT PROLENE 6-0 BV-1 30IN

## (undated) DEVICE — SUT MCRYL PLUS 4-0 PS2 27IN

## (undated) DEVICE — HEMOSTAT SURGICEL 4X8IN

## (undated) DEVICE — DRAIN CHANNEL ROUND 15FR

## (undated) DEVICE — INSERTS STEALTH FIBRA SIZE 1.

## (undated) DEVICE — SUT 2-0 12-18IN SILK

## (undated) DEVICE — BLADE SCALP OPHTL BEVEL STR

## (undated) DEVICE — SET DECANTER MEDICHOICE

## (undated) DEVICE — TRAY FOLEY 16FR INFECTION CONT

## (undated) DEVICE — SUT 3-0 12-18IN SILK

## (undated) DEVICE — COVER LIGHT HANDLE 80/CA

## (undated) DEVICE — DRESSING TELFA STRL 4X3 LF

## (undated) DEVICE — LOOP VESSEL BLUE MAXI

## (undated) DEVICE — TUBE PENROSE DRAIN 18IN X 3/8I

## (undated) DEVICE — APPLICATOR CHLORAPREP ORN 26ML

## (undated) DEVICE — ELECTRODE REM PLYHSV RETURN 9

## (undated) DEVICE — DRAPE THYROID WITH ARMBOARD

## (undated) DEVICE — SUT PROLENE 6-0 C-1 30IN BL